# Patient Record
Sex: MALE | Race: WHITE | NOT HISPANIC OR LATINO | Employment: OTHER | URBAN - METROPOLITAN AREA
[De-identification: names, ages, dates, MRNs, and addresses within clinical notes are randomized per-mention and may not be internally consistent; named-entity substitution may affect disease eponyms.]

---

## 2017-05-24 ENCOUNTER — APPOINTMENT (EMERGENCY)
Dept: RADIOLOGY | Facility: HOSPITAL | Age: 47
End: 2017-05-24

## 2017-05-24 ENCOUNTER — HOSPITAL ENCOUNTER (EMERGENCY)
Facility: HOSPITAL | Age: 47
Discharge: HOME | End: 2017-05-24
Attending: EMERGENCY MEDICINE | Admitting: EMERGENCY MEDICINE

## 2017-05-24 VITALS
HEART RATE: 75 BPM | WEIGHT: 212 LBS | OXYGEN SATURATION: 95 % | SYSTOLIC BLOOD PRESSURE: 130 MMHG | DIASTOLIC BLOOD PRESSURE: 74 MMHG | TEMPERATURE: 98.1 F | RESPIRATION RATE: 18 BRPM

## 2017-05-24 DIAGNOSIS — S50.02XA CONTUSION OF LEFT ELBOW: Primary | ICD-10-CM

## 2017-05-24 PROCEDURE — 99283 EMERGENCY DEPT VISIT LOW MDM: CPT

## 2017-05-24 PROCEDURE — 73080 X-RAY EXAM OF ELBOW: CPT

## 2017-05-24 RX ORDER — IBUPROFEN 800 MG/1
800 TABLET ORAL EVERY 8 HOURS PRN
Qty: 21 TABLET | Refills: 0 | Status: SHIPPED | OUTPATIENT
Start: 2017-05-24 | End: 2021-01-21

## 2017-05-24 RX ORDER — IBUPROFEN 400 MG/1
800 TABLET ORAL ONCE
Status: COMPLETED | OUTPATIENT
Start: 2017-05-24 | End: 2017-05-24

## 2017-05-24 RX ADMIN — IBUPROFEN 800 MG: 400 TABLET ORAL at 07:54

## 2018-04-23 ENCOUNTER — APPOINTMENT (OUTPATIENT)
Dept: URGENT CARE | Age: 48
End: 2018-04-23
Payer: OTHER MISCELLANEOUS

## 2018-04-23 ENCOUNTER — TRANSCRIBE ORDERS (OUTPATIENT)
Dept: URGENT CARE | Age: 48
End: 2018-04-23

## 2018-04-23 ENCOUNTER — APPOINTMENT (OUTPATIENT)
Dept: RADIOLOGY | Age: 48
End: 2018-04-23
Payer: OTHER MISCELLANEOUS

## 2018-04-23 DIAGNOSIS — T14.90XA INJURY: Primary | ICD-10-CM

## 2018-04-23 DIAGNOSIS — T14.90XA INJURY: ICD-10-CM

## 2018-04-23 PROCEDURE — G0382 LEV 3 HOSP TYPE B ED VISIT: HCPCS | Performed by: PREVENTIVE MEDICINE

## 2018-04-23 PROCEDURE — 99283 EMERGENCY DEPT VISIT LOW MDM: CPT | Performed by: PREVENTIVE MEDICINE

## 2018-04-23 PROCEDURE — 73630 X-RAY EXAM OF FOOT: CPT

## 2018-04-23 PROCEDURE — 73610 X-RAY EXAM OF ANKLE: CPT

## 2018-04-26 ENCOUNTER — APPOINTMENT (OUTPATIENT)
Dept: URGENT CARE | Age: 48
End: 2018-04-26
Payer: OTHER MISCELLANEOUS

## 2018-04-26 PROCEDURE — 99213 OFFICE O/P EST LOW 20 MIN: CPT | Performed by: PREVENTIVE MEDICINE

## 2018-05-07 ENCOUNTER — APPOINTMENT (OUTPATIENT)
Dept: URGENT CARE | Age: 48
End: 2018-05-07
Payer: OTHER MISCELLANEOUS

## 2018-05-07 PROCEDURE — 99213 OFFICE O/P EST LOW 20 MIN: CPT | Performed by: PREVENTIVE MEDICINE

## 2021-01-11 ENCOUNTER — APPOINTMENT (EMERGENCY)
Dept: RADIOLOGY | Facility: HOSPITAL | Age: 51
End: 2021-01-11
Payer: MEDICARE

## 2021-01-11 ENCOUNTER — HOSPITAL ENCOUNTER (EMERGENCY)
Facility: HOSPITAL | Age: 51
Discharge: HOME | End: 2021-01-11
Attending: EMERGENCY MEDICINE
Payer: MEDICARE

## 2021-01-11 VITALS
DIASTOLIC BLOOD PRESSURE: 80 MMHG | WEIGHT: 227.96 LBS | BODY MASS INDEX: 32.71 KG/M2 | TEMPERATURE: 97.9 F | SYSTOLIC BLOOD PRESSURE: 139 MMHG | RESPIRATION RATE: 16 BRPM | HEART RATE: 60 BPM | OXYGEN SATURATION: 96 %

## 2021-01-11 DIAGNOSIS — R07.9 CHEST PAIN: Primary | ICD-10-CM

## 2021-01-11 LAB
ALBUMIN SERPL BCP-MCNC: 3.8 G/DL (ref 3.5–5)
ALP SERPL-CCNC: 116 U/L (ref 46–116)
ALT SERPL W P-5'-P-CCNC: 35 U/L (ref 12–78)
ANION GAP SERPL CALCULATED.3IONS-SCNC: 9 MMOL/L (ref 4–13)
APTT PPP: 28 SECONDS (ref 23–37)
AST SERPL W P-5'-P-CCNC: 15 U/L (ref 5–45)
ATRIAL RATE: 63 BPM
ATRIAL RATE: 71 BPM
BASOPHILS # BLD AUTO: 0.09 THOUSANDS/ΜL (ref 0–0.1)
BASOPHILS NFR BLD AUTO: 1 % (ref 0–1)
BILIRUB SERPL-MCNC: 0.35 MG/DL (ref 0.2–1)
BUN SERPL-MCNC: 13 MG/DL (ref 5–25)
CALCIUM SERPL-MCNC: 9 MG/DL (ref 8.3–10.1)
CHLORIDE SERPL-SCNC: 102 MMOL/L (ref 100–108)
CK SERPL-CCNC: 64 U/L (ref 39–308)
CO2 SERPL-SCNC: 27 MMOL/L (ref 21–32)
CREAT SERPL-MCNC: 1.03 MG/DL (ref 0.6–1.3)
EOSINOPHIL # BLD AUTO: 0.98 THOUSAND/ΜL (ref 0–0.61)
EOSINOPHIL NFR BLD AUTO: 10 % (ref 0–6)
ERYTHROCYTE [DISTWIDTH] IN BLOOD BY AUTOMATED COUNT: 11.5 % (ref 11.6–15.1)
GFR SERPL CREATININE-BSD FRML MDRD: 85 ML/MIN/1.73SQ M
GLUCOSE SERPL-MCNC: 120 MG/DL (ref 65–140)
HCT VFR BLD AUTO: 43.2 % (ref 36.5–49.3)
HGB BLD-MCNC: 14.4 G/DL (ref 12–17)
IMM GRANULOCYTES # BLD AUTO: 0.05 THOUSAND/UL (ref 0–0.2)
IMM GRANULOCYTES NFR BLD AUTO: 1 % (ref 0–2)
INR PPP: 0.89 (ref 0.84–1.19)
LIPASE SERPL-CCNC: 176 U/L (ref 73–393)
LYMPHOCYTES # BLD AUTO: 3.46 THOUSANDS/ΜL (ref 0.6–4.47)
LYMPHOCYTES NFR BLD AUTO: 36 % (ref 14–44)
MCH RBC QN AUTO: 32.1 PG (ref 26.8–34.3)
MCHC RBC AUTO-ENTMCNC: 33.3 G/DL (ref 31.4–37.4)
MCV RBC AUTO: 96 FL (ref 82–98)
MONOCYTES # BLD AUTO: 0.83 THOUSAND/ΜL (ref 0.17–1.22)
MONOCYTES NFR BLD AUTO: 9 % (ref 4–12)
NEUTROPHILS # BLD AUTO: 4.23 THOUSANDS/ΜL (ref 1.85–7.62)
NEUTS SEG NFR BLD AUTO: 43 % (ref 43–75)
NRBC BLD AUTO-RTO: 0 /100 WBCS
P AXIS: 49 DEGREES
P AXIS: 78 DEGREES
PLATELET # BLD AUTO: 234 THOUSANDS/UL (ref 149–390)
PMV BLD AUTO: 12 FL (ref 8.9–12.7)
POTASSIUM SERPL-SCNC: 3.6 MMOL/L (ref 3.5–5.3)
PR INTERVAL: 158 MS
PR INTERVAL: 164 MS
PROT SERPL-MCNC: 7.1 G/DL (ref 6.4–8.2)
PROTHROMBIN TIME: 12.1 SECONDS (ref 11.6–14.5)
QRS AXIS: -15 DEGREES
QRS AXIS: 0 DEGREES
QRSD INTERVAL: 102 MS
QRSD INTERVAL: 98 MS
QT INTERVAL: 384 MS
QT INTERVAL: 402 MS
QTC INTERVAL: 411 MS
QTC INTERVAL: 417 MS
RBC # BLD AUTO: 4.48 MILLION/UL (ref 3.88–5.62)
SODIUM SERPL-SCNC: 138 MMOL/L (ref 136–145)
T WAVE AXIS: 48 DEGREES
T WAVE AXIS: 54 DEGREES
TROPONIN I SERPL-MCNC: <0.02 NG/ML
TROPONIN I SERPL-MCNC: <0.02 NG/ML
VENTRICULAR RATE: 63 BPM
VENTRICULAR RATE: 71 BPM
WBC # BLD AUTO: 9.64 THOUSAND/UL (ref 4.31–10.16)

## 2021-01-11 PROCEDURE — 96360 HYDRATION IV INFUSION INIT: CPT

## 2021-01-11 PROCEDURE — 80053 COMPREHEN METABOLIC PANEL: CPT | Performed by: PHYSICIAN ASSISTANT

## 2021-01-11 PROCEDURE — 83690 ASSAY OF LIPASE: CPT | Performed by: PHYSICIAN ASSISTANT

## 2021-01-11 PROCEDURE — 85730 THROMBOPLASTIN TIME PARTIAL: CPT | Performed by: PHYSICIAN ASSISTANT

## 2021-01-11 PROCEDURE — 85025 COMPLETE CBC W/AUTO DIFF WBC: CPT | Performed by: PHYSICIAN ASSISTANT

## 2021-01-11 PROCEDURE — 85610 PROTHROMBIN TIME: CPT | Performed by: PHYSICIAN ASSISTANT

## 2021-01-11 PROCEDURE — 96361 HYDRATE IV INFUSION ADD-ON: CPT

## 2021-01-11 PROCEDURE — 93010 ELECTROCARDIOGRAM REPORT: CPT | Performed by: INTERNAL MEDICINE

## 2021-01-11 PROCEDURE — 99285 EMERGENCY DEPT VISIT HI MDM: CPT | Performed by: PHYSICIAN ASSISTANT

## 2021-01-11 PROCEDURE — 99285 EMERGENCY DEPT VISIT HI MDM: CPT

## 2021-01-11 PROCEDURE — 71045 X-RAY EXAM CHEST 1 VIEW: CPT

## 2021-01-11 PROCEDURE — 36415 COLL VENOUS BLD VENIPUNCTURE: CPT | Performed by: PHYSICIAN ASSISTANT

## 2021-01-11 PROCEDURE — 82550 ASSAY OF CK (CPK): CPT | Performed by: PHYSICIAN ASSISTANT

## 2021-01-11 PROCEDURE — 84484 ASSAY OF TROPONIN QUANT: CPT | Performed by: PHYSICIAN ASSISTANT

## 2021-01-11 PROCEDURE — 93005 ELECTROCARDIOGRAM TRACING: CPT

## 2021-01-11 RX ADMIN — SODIUM CHLORIDE 1000 ML: 0.9 INJECTION, SOLUTION INTRAVENOUS at 02:26

## 2021-01-11 NOTE — Clinical Note
Antonia Cowan was seen and treated in our emergency department on 1/11/2021  Diagnosis:     Cuate Padilla    He may return on this date: 01/12/2021         If you have any questions or concerns, please don't hesitate to call        Bonnell Libman, PA-C    ______________________________           _______________          _______________  Hospital Representative                              Date                                Time

## 2021-01-11 NOTE — ED PROVIDER NOTES
History  Chief Complaint   Patient presents with    Chest Pain     Pt presents to the ED with c/o chest pain that woke him up  No chest pain currently, also c/o sore throat  Patient is a 14-year-old male with no significant past medical surgical history that presents emergency department with abrupt onset sharp nonradiating now abated left-sided chest pain for 1 hour  Patient denies associated symptomatology  Patient states that he was sleeping on his right side when he felt abrupt onset chest pain on the left-hand side of his chest that quickly abated spontaneously  Patient denies history of chest pain symptoms to present  Patient denies recent long train rides, plane rides, or other period of inactivity   Patient denies history of DVT, PE, and thrombophlebitis  Patient denies palliative and provocative factors  Patient denies not effective treatment  Patient denies fevers, chills, nausea, vomiting, diarrhea, constipation urinary symptoms  Patient's recent fall or recent trauma  Patient denies sick contacts or recent travel  Patient denies recent cough or URI symptoms  Patient states that he is a current half a pack a day cigarette smoker  Patient denies shortness of breath, and abdominal pain        History provided by:  Patient   used: No    Chest Pain  Pain location:  L chest  Pain quality: sharp    Pain radiates to:  Does not radiate  Pain radiates to the back: no    Pain severity:  Mild  Onset quality:  Sudden  Duration:  1 hour  Timing:  Sporadic  Progression:  Resolved  Chronicity:  New  Relieved by:  Nothing  Worsened by:  Nothing tried  Ineffective treatments:  None tried  Associated symptoms: no abdominal pain, no anxiety, no back pain, no cough, no dizziness, no fever, no headache, no nausea, no numbness, no palpitations, no shortness of breath, not vomiting and no weakness    Risk factors: male sex and smoking    Risk factors: no prior DVT/PE        Prior to Admission Medications   Prescriptions Last Dose Informant Patient Reported? Taking?   ibuprofen (MOTRIN) 800 mg tablet Not Taking at Unknown time  No No   Sig: Take 1 tablet by mouth every 8 (eight) hours as needed for mild pain (take with food)   Patient not taking: Reported on 1/11/2021   oxyCODONE-acetaminophen (PERCOCET) 5-325 mg per tablet Not Taking at Unknown time  No No   Sig: Take 1 tablet by mouth every 6 (six) hours as needed for moderate pain for up to 10 doses Max Daily Amount: 4 tablets   Patient not taking: Reported on 1/11/2021   oxyCODONE-acetaminophen (PERCOCET) 5-325 mg per tablet Not Taking at Unknown time  No No   Sig: Take 1 tablet by mouth every 6 (six) hours as needed for moderate pain for up to 10 doses Max Daily Amount: 4 tablets   Patient not taking: Reported on 1/11/2021      Facility-Administered Medications: None       No past medical history on file  No past surgical history on file  No family history on file  I have reviewed and agree with the history as documented  E-Cigarette/Vaping     E-Cigarette/Vaping Substances     Social History     Tobacco Use    Smoking status: Current Every Day Smoker   Substance Use Topics    Alcohol use: Not on file    Drug use: Not on file       Review of Systems   Constitutional: Negative for activity change, appetite change, chills and fever  HENT: Negative for congestion, postnasal drip, rhinorrhea, sinus pressure, sinus pain, sore throat and tinnitus  Eyes: Negative for photophobia and visual disturbance  Respiratory: Negative for cough, chest tightness and shortness of breath  Cardiovascular: Positive for chest pain  Negative for palpitations  Gastrointestinal: Negative for abdominal pain, constipation, diarrhea, nausea and vomiting  Genitourinary: Negative for difficulty urinating, dysuria, flank pain, frequency and urgency  Musculoskeletal: Negative for back pain, gait problem, neck pain and neck stiffness     Skin: Negative for pallor and rash  Allergic/Immunologic: Negative for environmental allergies and food allergies  Neurological: Negative for dizziness, weakness, numbness and headaches  Psychiatric/Behavioral: Negative for confusion  All other systems reviewed and are negative  Physical Exam  Physical Exam  Vitals signs and nursing note reviewed  Constitutional:       General: He is awake  Appearance: Normal appearance  He is well-developed  He is not ill-appearing, toxic-appearing or diaphoretic  HENT:      Head: Normocephalic and atraumatic  Right Ear: Hearing and external ear normal  No decreased hearing noted  No drainage, swelling or tenderness  No mastoid tenderness  Left Ear: Hearing and external ear normal  No decreased hearing noted  No drainage, swelling or tenderness  No mastoid tenderness  Nose: Nose normal       Mouth/Throat:      Lips: Pink  Mouth: Mucous membranes are moist       Pharynx: Oropharynx is clear  Uvula midline  Eyes:      General: Lids are normal  Vision grossly intact  Right eye: No discharge  Left eye: No discharge  Extraocular Movements: Extraocular movements intact  Conjunctiva/sclera: Conjunctivae normal       Pupils: Pupils are equal, round, and reactive to light  Neck:      Musculoskeletal: Full passive range of motion without pain, normal range of motion and neck supple  Normal range of motion  No neck rigidity, spinous process tenderness or muscular tenderness  Vascular: No JVD  Trachea: Trachea and phonation normal  No tracheal tenderness or tracheal deviation  Cardiovascular:      Rate and Rhythm: Normal rate and regular rhythm  Pulses: Normal pulses  Radial pulses are 2+ on the right side and 2+ on the left side  Posterior tibial pulses are 2+ on the right side and 2+ on the left side  Heart sounds: Normal heart sounds     Pulmonary:      Effort: Pulmonary effort is normal  Breath sounds: Normal breath sounds  No stridor  No decreased breath sounds, wheezing, rhonchi or rales  Chest:      Chest wall: No tenderness  Comments: Patient has no overlying epidermal rashes, lesions, erythema, ecchymosis, or abrasion anterior and posterior thoracic region  Skin intact  Abdominal:      General: Abdomen is flat  Bowel sounds are normal  There is no distension  Palpations: Abdomen is soft  Abdomen is not rigid  Tenderness: There is no abdominal tenderness  There is no guarding or rebound  Musculoskeletal: Normal range of motion  Right lower leg: Normal       Left lower leg: Normal    Lymphadenopathy:      Head:      Right side of head: No submental, submandibular, tonsillar, preauricular, posterior auricular or occipital adenopathy  Left side of head: No submental, submandibular, tonsillar, preauricular, posterior auricular or occipital adenopathy  Cervical: No cervical adenopathy  Right cervical: No superficial, deep or posterior cervical adenopathy  Left cervical: No superficial, deep or posterior cervical adenopathy  Skin:     General: Skin is warm  Capillary Refill: Capillary refill takes less than 2 seconds  Neurological:      General: No focal deficit present  Mental Status: He is alert and oriented to person, place, and time  GCS: GCS eye subscore is 4  GCS verbal subscore is 5  GCS motor subscore is 6  Sensory: No sensory deficit  Deep Tendon Reflexes: Reflexes are normal and symmetric  Reflex Scores:       Patellar reflexes are 2+ on the right side and 2+ on the left side  Psychiatric:         Mood and Affect: Mood normal          Speech: Speech normal          Behavior: Behavior normal  Behavior is cooperative  Thought Content:  Thought content normal          Judgment: Judgment normal          Vital Signs  ED Triage Vitals   Temperature Pulse Respirations Blood Pressure SpO2   01/11/21 0156 01/11/21 0156 01/11/21 0156 01/11/21 0156 01/11/21 0156   97 9 °F (36 6 °C) 85 16 (!) 174/96 98 %      Temp Source Heart Rate Source Patient Position - Orthostatic VS BP Location FiO2 (%)   01/11/21 0156 01/11/21 0156 01/11/21 0230 01/11/21 0156 --   Oral Monitor Lying Right arm       Pain Score       01/11/21 0156       3           Vitals:    01/11/21 0430 01/11/21 0445 01/11/21 0500 01/11/21 0515   BP: 158/92  139/80    Pulse: 62 58 60 60   Patient Position - Orthostatic VS:             Visual Acuity      ED Medications  Medications   sodium chloride 0 9 % bolus 1,000 mL (0 mL Intravenous Stopped 1/11/21 0625)       Diagnostic Studies  Results Reviewed     Procedure Component Value Units Date/Time    Troponin I repeat in 3hrs [[id number]]  (Normal) Collected: 01/11/21 0523    Lab Status: Final result Specimen: Blood from Arm, Left Updated: 01/11/21 0624     Troponin I <0 02 ng/mL     Troponin I [[id number]]  (Normal) Collected: 01/11/21 0229    Lab Status: Final result Specimen: Blood from Arm, Left Updated: 01/11/21 0315     Troponin I <0 02 ng/mL     Comprehensive metabolic panel [[id number]] Collected: 01/11/21 0227    Lab Status: Final result Specimen: Blood from Arm, Left Updated: 01/11/21 9571     Sodium 138 mmol/L      Potassium 3 6 mmol/L      Chloride 102 mmol/L      CO2 27 mmol/L      ANION GAP 9 mmol/L      BUN 13 mg/dL      Creatinine 1 03 mg/dL      Glucose 120 mg/dL      Calcium 9 0 mg/dL      AST 15 U/L      ALT 35 U/L      Alkaline Phosphatase 116 U/L      Total Protein 7 1 g/dL      Albumin 3 8 g/dL      Total Bilirubin 0 35 mg/dL      eGFR 85 ml/min/1 73sq m     Narrative:      Cory guidelines for Chronic Kidney Disease (CKD):     Stage 1 with normal or high GFR (GFR > 90 mL/min/1 73 square meters)    Stage 2 Mild CKD (GFR = 60-89 mL/min/1 73 square meters)    Stage 3A Moderate CKD (GFR = 45-59 mL/min/1 73 square meters)    Stage 3B Moderate CKD (GFR = 30-44 mL/min/1 73 square meters)    Stage 4 Severe CKD (GFR = 15-29 mL/min/1 73 square meters)    Stage 5 End Stage CKD (GFR <15 mL/min/1 73 square meters)  Note: GFR calculation is accurate only with a steady state creatinine    Lipase [[id number]]  (Normal) Collected: 01/11/21 0227    Lab Status: Final result Specimen: Blood from Arm, Left Updated: 01/11/21 0312     Lipase 176 u/L     CK Total with Reflex CKMB [[id number]]  (Normal) Collected: 01/11/21 0227    Lab Status: Final result Specimen: Blood from Arm, Left Updated: 01/11/21 0312     Total CK 64 U/L     Protime-INR [[id number]]  (Normal) Collected: 01/11/21 0227    Lab Status: Final result Specimen: Blood from Arm, Left Updated: 01/11/21 0249     Protime 12 1 seconds      INR 0 89    APTT [[id number]]  (Normal) Collected: 01/11/21 0227    Lab Status: Final result Specimen: Blood from Arm, Left Updated: 01/11/21 0249     PTT 28 seconds     CBC and differential [[id number]]  (Abnormal) Collected: 01/11/21 0227    Lab Status: Final result Specimen: Blood from Arm, Left Updated: 01/11/21 0237     WBC 9 64 Thousand/uL      RBC 4 48 Million/uL      Hemoglobin 14 4 g/dL      Hematocrit 43 2 %      MCV 96 fL      MCH 32 1 pg      MCHC 33 3 g/dL      RDW 11 5 %      MPV 12 0 fL      Platelets 047 Thousands/uL      nRBC 0 /100 WBCs      Neutrophils Relative 43 %      Immat GRANS % 1 %      Lymphocytes Relative 36 %      Monocytes Relative 9 %      Eosinophils Relative 10 %      Basophils Relative 1 %      Neutrophils Absolute 4 23 Thousands/µL      Immature Grans Absolute 0 05 Thousand/uL      Lymphocytes Absolute 3 46 Thousands/µL      Monocytes Absolute 0 83 Thousand/µL      Eosinophils Absolute 0 98 Thousand/µL      Basophils Absolute 0 09 Thousands/µL                  XR chest 1 view portable   ED Interpretation by Tang Taylor PA-C (01/11 0194)   Chest x-ray no acute cardiopulmonary disease on initial read                 Procedures  ECG 12 Lead Documentation Only    Date/Time: 1/11/2021 2:01 AM  Performed by: Shailesh Owusu PA-C  Authorized by: Shailesh Owusu PA-C     Indications / Diagnosis:  Chest pain  ECG reviewed by me, the ED Provider: yes    Patient location:  ED  Previous ECG:     Previous ECG:  Compared to current    Similarity:  No change    Comparison to cardiac monitor: Yes    Interpretation:     Interpretation: normal    Rate:     ECG rate:  71    ECG rate assessment: normal    Rhythm:     Rhythm: sinus rhythm    Ectopy:     Ectopy: none    QRS:     QRS axis:  Normal    QRS intervals:  Normal  Conduction:     Conduction: normal    ST segments:     ST segments:  Normal  T waves:     T waves: normal      ECG 12 Lead Documentation Only    Date/Time: 1/11/2021 5:20 AM  Performed by: Shailesh Owusu PA-C  Authorized by: Shailesh Owusu PA-C     Indications / Diagnosis:  Chest pain; delta  ECG reviewed by me, the ED Provider: yes    Patient location:  ED  Previous ECG:     Previous ECG:  Compared to current    Similarity:  No change    Comparison to cardiac monitor: Yes    Interpretation:     Interpretation: normal    Rate:     ECG rate:  63    ECG rate assessment: normal    Rhythm:     Rhythm: sinus rhythm    Ectopy:     Ectopy: none    QRS:     QRS axis:  Normal    QRS intervals:  Normal  Conduction:     Conduction: normal    ST segments:     ST segments:  Normal  T waves:     T waves: normal               ED Course  ED Course as of Jan 11 0700   Mon Jan 11, 2021   0211 Patient verbalizes left-sided chest pain symptoms sharp and shooting abrupt onset nonradiating  Patient denies pain at this time  Patient has history of DVT, PE, thrombophlebitis  0964 Patient denies pain at this time        0345 Patient denies pain      0451 Patient denies throat pain       0626 Troponin I: <0 02             HEART Risk Score      Most Recent Value   Heart Score Risk Calculator   History  0 Filed at: 01/11/2021 0317   ECG  0 Filed at: 01/11/2021 4948   Age  0 Filed at: 01/11/2021 0317   Risk Factors  1 Filed at: 01/11/2021 0317   Troponin  0 Filed at: 01/11/2021 0317   HEART Score  1 Filed at: 01/11/2021 0317              PERC Rule for PE      Most Recent Value   PERC Rule for PE   Age >=50  0 Filed at: 01/11/2021 0218   HR >=100  0 Filed at: 01/11/2021 0218   O2 Sat on room air < 95%  0 Filed at: 01/11/2021 9666   History of PE or DVT  0 Filed at: 01/11/2021 7769   Recent trauma or surgery  0 Filed at: 01/11/2021 0218   Hemoptysis  0 Filed at: 01/11/2021 1789   Exogenous estrogen  0 Filed at: 01/11/2021 0077   Unilateral leg swelling  0 Filed at: 01/11/2021 5842   PERC Rule for PE Results  0 Filed at: 01/11/2021 6379                    Wells' Criteria for DVT      Most Recent Value   Wells' Criteria for DVT   Active cancer Treatment or palliation within 6 months  0 Filed at: 01/11/2021 9309   Bedridden recently >3 days or major surgery within 12 weeks  0 Filed at: 01/11/2021 4168   Calf swelling >3 cm compared to the other leg  0 Filed at: 01/11/2021 0725   Entire leg swollen  0 Filed at: 01/11/2021 3595   Collateral (nonvaricose) superficial veins present  0 Filed at: 01/11/2021 2823   Localized tenderness along the deep venous system  0 Filed at: 01/11/2021 9997   Pitting edema, confined to symptomatic leg  0 Filed at: 01/11/2021 0545   Paralysis, paresis, or recent plaster immobilization of the lower extremity  0 Filed at: 01/11/2021 3445   Previously documented DVT  0 Filed at: 01/11/2021 0472   Alternative diagnosis to DVT as likely or more likely  0 Filed at: 01/11/2021 3313   Wells DVT Critera Score  0 Filed at: 01/11/2021 0218              MDM  Number of Diagnoses or Management Options  Chest pain: new and does not require workup     Amount and/or Complexity of Data Reviewed  Clinical lab tests: ordered and reviewed  Tests in the radiology section of CPT®: ordered and reviewed  Review and summarize past medical records: yes  Independent visualization of images, tracings, or specimens: yes    Risk of Complications, Morbidity, and/or Mortality  Presenting problems: moderate  Diagnostic procedures: moderate  Management options: low    Patient Progress  Patient progress: stable     Patient is a 57-year-old male with no significant past medical surgical history that presents emergency department with abrupt onset sharp nonradiating now abated left-sided chest pain for 1 hour  Patient denies associated symptomatology  No leukocytosis with negative lipase with acute pancreatitis unlikely  Normal liver enzymes, normal total bilirubin, and normal alk-phos with hepatobiliary pathology not likely  Normal kidney function; normal electrolytes  Initial read on chest x-ray no acute cardiopulmonary disease   Initial troponin negative, delta troponin negative  Initial ECG normal sinus rhythm, delta ECG normal sinus rhythm   Negative CK  Patient denied pain throughout the entire ED visit  Wells 0, Perc 0, Heart 0  Follow-up with cardiology for ordered exercise stress test  Follow-up with PCP  Follow up with emergency department symptoms persist or exacerbate  Patient demonstrates verbal understanding of all clinical laboratory and imaging findings, discharge instructions, follow-up, and treatment plan    Disposition  Final diagnoses:   Chest pain     Time reflects when diagnosis was documented in both MDM as applicable and the Disposition within this note     Time User Action Codes Description Comment    1/11/2021  6:27 AM Alfonso Boothe Add [R07 9] Chest pain       ED Disposition     ED Disposition Condition Date/Time Comment    Discharge Stable Mon Jan 11, 2021  6:27 AM Noemy Lindsay  discharge to home/self care              Follow-up Information     Follow up With Specialties Details Why Contact Info Additional Information    [facility] [place] [facility] Call in 1 week for further evaluation of symptoms;stress test [phone]  [address] [facility] [address] [facility], [place], [place], [address] [facility] Call in 1 week for further evaluation of symptoms [address] [phone]  [address]  , [place], [place], [address]    [facility] Emergency Medicine Go to  As needed [address] [facility] [address], [address], [place], [place], [address]          Discharge Medication List as of 1/11/2021  6:27 AM      CONTINUE these medications which have NOT CHANGED    Details   ibuprofen (MOTRIN) 800 mg tablet Take 1 tablet by mouth every 8 (eight) hours as needed for mild pain (take with food), Starting 5/24/2017, Until Discontinued, Print      !! oxyCODONE-acetaminophen (PERCOCET) 5-325 mg per tablet Take 1 tablet by mouth every 6 (six) hours as needed for moderate pain for up to 10 doses Max Daily Amount: 4 tablets, Starting 10/13/2016, Until Discontinued, Print      !! oxyCODONE-acetaminophen (PERCOCET) 5-325 mg per tablet Take 1 tablet by mouth every 6 (six) hours as needed for moderate pain for up to 10 doses Max Daily Amount: 4 tablets, Starting 10/13/2016, Until Discontinued, Print       !! - Potential duplicate medications found  Please discuss with provider  No discharge procedures on file      PDMP Review     None          ED Provider  Electronically Signed by           Arabella Landry PA-C  01/11/21 0700       Arabella Landry PA-C  01/11/21 0700

## 2021-01-11 NOTE — DISCHARGE INSTRUCTIONS
Follow-up with cardiology for stress test  Follow-up with PCP  Follow up emergency department symptoms persist or exacerbate

## 2021-01-15 ENCOUNTER — OFFICE VISIT (OUTPATIENT)
Dept: FAMILY MEDICINE CLINIC | Facility: OTHER | Age: 51
End: 2021-01-15
Payer: MEDICARE

## 2021-01-15 VITALS
RESPIRATION RATE: 18 BRPM | WEIGHT: 218 LBS | HEIGHT: 71 IN | TEMPERATURE: 97.8 F | BODY MASS INDEX: 30.52 KG/M2 | SYSTOLIC BLOOD PRESSURE: 150 MMHG | DIASTOLIC BLOOD PRESSURE: 80 MMHG | OXYGEN SATURATION: 98 % | HEART RATE: 100 BPM

## 2021-01-15 DIAGNOSIS — F17.218 CIGARETTE NICOTINE DEPENDENCE WITH OTHER NICOTINE-INDUCED DISORDER: ICD-10-CM

## 2021-01-15 DIAGNOSIS — I10 ESSENTIAL HYPERTENSION: ICD-10-CM

## 2021-01-15 DIAGNOSIS — R07.9 CHEST PAIN, UNSPECIFIED TYPE: Primary | ICD-10-CM

## 2021-01-15 DIAGNOSIS — Z23 ENCOUNTER FOR IMMUNIZATION: ICD-10-CM

## 2021-01-15 PROCEDURE — 99203 OFFICE O/P NEW LOW 30 MIN: CPT | Performed by: FAMILY MEDICINE

## 2021-01-15 PROCEDURE — 90471 IMMUNIZATION ADMIN: CPT

## 2021-01-15 PROCEDURE — 90686 IIV4 VACC NO PRSV 0.5 ML IM: CPT

## 2021-01-15 RX ORDER — LISINOPRIL 10 MG/1
10 TABLET ORAL DAILY
Qty: 30 TABLET | Refills: 2 | Status: SHIPPED | OUTPATIENT
Start: 2021-01-15

## 2021-01-15 NOTE — PROGRESS NOTES
Assessment/Plan:    No problem-specific Assessment & Plan notes found for this encounter  Problem List Items Addressed This Visit     None      Visit Diagnoses     Chest pain, unspecified type    -  Primary    Relevant Orders  No pain currently and since discharge from ER  ER work up negative to date  Will check labs and refer to cards as he is recommended to follow up in 1-2 weeks for possible stress test and other eval per cards  Ambulatory referral to Cardiology    Encounter for immunization        Relevant Orders    influenza vaccine, quadrivalent, 0 5 mL, preservative-free, for adult and pediatric patients 6 mos+ (AFLURIA, FLUARIX, FLULAVAL, FLUZONE) (Completed)    Essential hypertension        Relevant Medications  Will start on the following and recommended to start at 5 mg daily and increase if BP is still high  Low salt diet and dash diet hand out provided  FU in 2-4 weeks  lisinopril (ZESTRIL) 10 mg tablet    Other Relevant Orders    TSH, 3rd generation with Free T4 reflex    Lipid panel    HEMOGLOBIN A1C W/ EAG ESTIMATION    UA w Reflex to Microscopic w Reflex to Culture -Lab Collect    Ambulatory referral to Cardiology    Cigarette nicotine dependence with other nicotine-induced disorder        Relevant Orders  Discussed smoking cessation as per shahida    Ambulatory referral to Cardiology        BMI Counseling: Body mass index is 30 4 kg/m²  The BMI is above normal  Nutrition recommendations include limiting drinks that contain sugar and reducing intake of cholesterol  Exercise recommendations include moderate physical activity 150 minutes/week  Tobacco Cessation Counseling: Tobacco cessation counseling was provided  The patient is sincerely urged to quit consumption of tobacco  He is not ready to quit tobacco  Tobacco Cessation Counseling: Tobacco cessation counseling and education was provided   The patient is sincerely urged to quit consumption of tobacco  He is not ready to quit tobacco  The numerous health risks of tobacco consumption were discussed  If he decides to quit, there are a number of helpful adjunctive aids, and he can see me to discuss nicotine replacement therapy, chantix, or bupropion anytime in the future  Will be cutting down for sure  Time spent 3-5 minutes  Subjective:      Patient ID: Enolia Litten  is a 52 y o  male  Patient is here to establish care today  Was seen at the ER on 1/11/21 due to sharp chest pain  ER course:  Patient is a 57-year-old male with no significant past medical surgical history that presents emergency department with abrupt onset sharp nonradiating now abated left-sided chest pain for 1 hour  Patient denies associated symptomatology  No leukocytosis with negative lipase with acute pancreatitis unlikely  Normal liver enzymes, normal total bilirubin, and normal alk-phos with hepatobiliary pathology not likely  Normal kidney function; normal electrolytes  Initial read on chest x-ray no acute cardiopulmonary disease   Initial troponin negative, delta troponin negative  Initial ECG normal sinus rhythm, delta ECG normal sinus rhythm   Negative CK  Patient denied pain throughout the entire ED visit  Wells 0, Perc 0, Heart 0  Follow-up with cardiology for ordered exercise stress test  Follow-up with PCP  Follow up with emergency department symptoms persist or exacerbate  Patient demonstrates verbal understanding of all clinical laboratory and imaging findings, discharge instructions, follow-up, and treatment plan     Today:  Patient is doing well and back to his baseline  No chest pain noted since discharge from the ER on 1/11/21  He has no SOB or Cough or congestion  Denies any HARRISON  Denies neurologic deficits either  Due to increased BP twice already will start on lisinopril daily to control  Most like has HTN  Patient is long time smoker since age 27 1/2 PPD smoking up to age 52    Strongly advised against smoking but he is not ready and would like to start by reducing and cutting down first which is reasonable  The following portions of the patient's history were reviewed and updated as appropriate:   He  has no past medical history on file  He There are no active problems to display for this patient  He  has a past surgical history that includes Appendectomy; Tonsillectomy; and Knee surgery  His family history is not on file  He  reports that he has been smoking cigarettes  He has been smoking about 0 50 packs per day  He has never used smokeless tobacco  He reports previous alcohol use  He reports that he does not use drugs  Current Outpatient Medications   Medication Sig Dispense Refill    ibuprofen (MOTRIN) 800 mg tablet Take 1 tablet by mouth every 8 (eight) hours as needed for mild pain (take with food) (Patient not taking: Reported on 1/11/2021) 21 tablet 0    lisinopril (ZESTRIL) 10 mg tablet Take 1 tablet (10 mg total) by mouth daily 30 tablet 2    oxyCODONE-acetaminophen (PERCOCET) 5-325 mg per tablet Take 1 tablet by mouth every 6 (six) hours as needed for moderate pain for up to 10 doses Max Daily Amount: 4 tablets (Patient not taking: Reported on 1/11/2021) 10 tablet 0    oxyCODONE-acetaminophen (PERCOCET) 5-325 mg per tablet Take 1 tablet by mouth every 6 (six) hours as needed for moderate pain for up to 10 doses Max Daily Amount: 4 tablets (Patient not taking: Reported on 1/11/2021) 10 tablet 0     No current facility-administered medications for this visit        Current Outpatient Medications on File Prior to Visit   Medication Sig    ibuprofen (MOTRIN) 800 mg tablet Take 1 tablet by mouth every 8 (eight) hours as needed for mild pain (take with food) (Patient not taking: Reported on 1/11/2021)    oxyCODONE-acetaminophen (PERCOCET) 5-325 mg per tablet Take 1 tablet by mouth every 6 (six) hours as needed for moderate pain for up to 10 doses Max Daily Amount: 4 tablets (Patient not taking: Reported on 1/11/2021)    oxyCODONE-acetaminophen (PERCOCET) 5-325 mg per tablet Take 1 tablet by mouth every 6 (six) hours as needed for moderate pain for up to 10 doses Max Daily Amount: 4 tablets (Patient not taking: Reported on 1/11/2021)     No current facility-administered medications on file prior to visit  He is allergic to penicillins       Review of Systems   Constitutional: Negative for fatigue, fever and unexpected weight change  HENT: Negative for congestion  Eyes: Negative for visual disturbance  Respiratory: Negative for cough and shortness of breath  Cardiovascular: Negative for chest pain and palpitations  Gastrointestinal: Negative for abdominal pain  Musculoskeletal: Negative for arthralgias and myalgias  Neurological: Negative for weakness, light-headedness, numbness and headaches  Objective:      /80   Pulse 100   Temp 97 8 °F (36 6 °C)   Resp 18   Ht 5' 11" (1 803 m)   Wt 98 9 kg (218 lb)   SpO2 98%   BMI 30 40 kg/m²          Physical Exam  Vitals signs and nursing note reviewed  Constitutional:       General: He is not in acute distress  Appearance: He is well-developed  He is not diaphoretic  HENT:      Head: Normocephalic and atraumatic  Eyes:      General: No scleral icterus  Right eye: No discharge  Left eye: No discharge  Neck:      Musculoskeletal: Normal range of motion and neck supple  Cardiovascular:      Rate and Rhythm: Normal rate and regular rhythm  Heart sounds: Normal heart sounds  No murmur  Pulmonary:      Effort: Pulmonary effort is normal  No respiratory distress  Breath sounds: Normal breath sounds  No wheezing  Musculoskeletal:      Right lower leg: No edema  Left lower leg: No edema  Lymphadenopathy:      Cervical: No cervical adenopathy  Skin:     General: Skin is warm and dry  Coloration: Skin is not pale  Findings: No erythema or rash     Neurological:      General: No focal deficit present  Mental Status: He is oriented to person, place, and time     Psychiatric:         Mood and Affect: Mood normal          Behavior: Behavior normal          Lab Results   Component Value Date    WBC 9 64 01/11/2021    HGB 14 4 01/11/2021    HCT 43 2 01/11/2021     01/11/2021    ALT 35 01/11/2021    AST 15 01/11/2021     02/26/2015    K 3 6 01/11/2021     01/11/2021    CREATININE 1 03 01/11/2021    BUN 13 01/11/2021    CO2 27 01/11/2021    INR 0 89 01/11/2021     No results found for: [id number], TSH

## 2021-01-15 NOTE — PATIENT INSTRUCTIONS
Cigarette Smoking and Your Health   AMBULATORY CARE:   Risks to your health if you smoke:  Nicotine and other chemicals found in tobacco and e-cigarettes can damage every cell in your body  Even if you are a light smoker, you have an increased risk for cancer, heart disease, and lung disease  If you are pregnant or have diabetes, smoking increases your risk for complications  Nicotine can affect an adolescent's developing brain  This can lead to trouble thinking, learning, or paying attention  Benefits to your health if you stop smoking:   · You decrease respiratory symptoms such as coughing, wheezing, and shortness of breath  · You reduce your risk for cancers of the lung, mouth, throat, kidney, bladder, pancreas, stomach, and cervix  If you already have cancer, you increase the benefits of chemotherapy  You also reduce your risk for cancer returning or a second cancer from developing  · You reduce your risk for heart disease, blood clots, heart attack, and stroke  · You reduce your risk for lung infections, and diseases such as pneumonia, asthma, chronic bronchitis, and emphysema  · Your circulation improves  More oxygen can be delivered to your body  If you have diabetes, you lower your risk for complications, such as kidney, artery, and eye diseases  You also lower your risk for nerve damage  Nerve damage can lead to amputations, poor vision, and blindness  · You improve your body's ability to heal and to fight infections  · An adolescent can help his or her brain and body develop in a healthy way  Talk to your adolescent about all the health risks of nicotine  If you can, start talking about nicotine when your child is younger than 12 years  This may make it easier for him or her not to start using nicotine as a teenager or adult  Explain to him or her that it is best never to start  It can be hard to try to quit later      Benefits to the health of others if you stop smoking:  Tobacco is harmful to nonsmokers who breathe in your secondhand smoke  The following are ways the health of others around you may improve when you stop smoking:  · You lower the risks for lung cancer and heart disease in nonsmoking adults  · If you are pregnant, you lower the risk for miscarriage, early delivery, low birth weight, and stillbirth  You also lower your baby's risk for SIDS, obesity, developmental delay, and neurobehavioral problems, such as ADHD  · If you have children, you lower their risk for ear infections, colds, pneumonia, bronchitis, and asthma  Follow up with your doctor as directed:  Write down your questions so you remember to ask them during your visits  For more information and support to stop smoking:   · [employer]  gov  Phone: 6- 747 - 381-4612  Web Address: [employer]  [employer] [address] Information is for End User's use only and may not be sold, redistributed or otherwise used for commercial purposes  All illustrations and images included in CareNotes® are the copyrighted property of [employer]  or [address] [place] [address]  The above information is an  only  It is not intended as medical advice for individual conditions or treatments  Talk to your doctor, nurse or pharmacist before following any medical regimen to see if it is safe and effective for you  DASH Eating Plan   WHAT YOU NEED TO KNOW:   The DASH (Dietary Approaches to Stop Hypertension) Eating Plan is designed to help prevent or lower high blood pressure  It can also help to lower LDL (bad) cholesterol and decrease your risk of heart disease  The plan is low in sodium, sugar, unhealthy fats, and total fat  It is high in potassium, calcium, magnesium, and fiber  These nutrients are added when you eat more fruits, vegetables, and whole grains  DISCHARGE INSTRUCTIONS:   Your sodium limit each day: Your dietitian will tell you how much sodium is safe for you to have each day   People with high blood pressure should have no more than 1,500 to 2,300 mg of sodium in a day  A teaspoon (tsp) of salt has 2,300 mg of sodium  This may seem like a difficult goal, but small changes to the foods you eat can make a big difference  Your healthcare provider or dietitian can help you create a meal plan that follows your sodium limit  How to limit sodium:   · Read food labels  Food labels can help you choose foods that are low in sodium  The amount of sodium is listed in milligrams (mg)  The % Daily Value (DV) column tells you how much of your daily needs are met by 1 serving of the food for each nutrient listed  Choose foods that have less than 5% of the DV of sodium  These foods are considered low in sodium  Foods that have 20% or more of the DV of sodium are considered high in sodium  Avoid foods that have more than 300 mg of sodium in each serving  Choose foods that say low-sodium, reduced-sodium, or no salt added on the food label  · Avoid salt  Do not salt food at the table, and add very little salt to foods during cooking  Use herbs and spices, such as onions, garlic, and salt-free seasonings to add flavor to foods  Try lemon or lime juice or vinegar to give foods a tart flavor  Use hot peppers or a small amount of hot pepper sauce to add a spicy flavor to foods  · Ask about salt substitutes  Ask your healthcare provider if you may use salt substitutes  Some salt substitutes have ingredients that can be harmful if you have certain health conditions  · Choose foods carefully at restaurants  Meals from restaurants, especially fast food restaurants, are often high in sodium  Some restaurants have nutrition information that tells you the amount of sodium in their foods  Ask to have your food prepared with less, or no salt  What you need to know about fats:   · Include healthy fats  Examples are unsaturated fats and omega-3 fatty acids   Unsaturated fats are found in soybean, canola, olive, or sunflower oil, and liquid and soft tub margarines  Omega-3 fatty acids are found in fatty fish, such as salmon, tuna, mackerel, and sardines  It is also found in flaxseed oil and ground flaxseed  · Avoid unhealthy fats  Do not eat unhealthy fats, such as saturated fats and trans fats  Saturated fats are found in foods that contain fat from animals  Examples are fatty meats, whole milk, butter, cream, and other dairy foods  It is also found in shortening, stick margarine, palm oil, and coconut oil  Trans fats are found in fried foods, crackers, chips, and baked goods made with margarine or shortening  Foods to include: With the DASH eating plan, you need to eat a certain number of servings from each food group  This will help you get enough of certain nutrients and limit others  The amount of servings you should eat depends on how many calories you need  Your dietitian can tell you how many calories you need  The number of servings listed next to the food groups below are for people who need about 2,000 calories each day  · Grains:  6 to 8 servings (3 of these servings should be whole-grain foods)    ? 1 slice of whole-grain bread     ? 1 ounce of dry cereal    ? ½ cup of cooked cereal, pasta, or brown rice    · Vegetables and fruits:  4 to 5 servings of fruits and 4 to 5 servings of vegetables    ? 1 medium fruit    ? ½ cup of frozen, canned (no added salt), or chopped fresh vegetables     ? ½ cup of fresh, frozen, dried, or canned fruit (canned in light syrup or fruit juice)    ? ½ cup of vegetable or fruit juice    · Dairy:  2 to 3 servings    ? 1 cup of nonfat (skim) or 1% milk    ? 1½ ounces of fat-free or low-fat cheese    ? 6 ounces of nonfat or low-fat yogurt    · Lean meat, poultry, and fish:  6 ounces or less    ? Poultry (chicken, turkey) with no skin    ? Fish (especially fatty fish, such as salmon, fresh tuna, or mackerel)    ?  Lean beef and pork (loin, round, extra lean hamburger)    ? Egg whites and egg substitutes    · Nuts, seeds, and legumes:  4 to 5 servings each week    ? ½ cup of cooked beans and peas    ? 1½ ounces of unsalted nuts    ? 2 tablespoons of peanut butter or seeds    · Sweets and added sugars:  5 or less each week    ? 1 tablespoon of sugar, jelly, or jam    ? ½ cup of sorbet or gelatin    ? 1 cup of lemonade    · Fats:  2 to 3 servings each week    ? 1 teaspoon of soft margarine or vegetable oil    ? 1 tablespoon of mayonnaise    ? 2 tablespoons of salad dressing    Foods to avoid:   · Grains:      ? Baked goods, such as doughnuts, pastries, cookies, and biscuits (high in fat and sugar)    ? Mixes for cornbread and biscuits, packaged foods, such as bread stuffing, rice and pasta mixes, macaroni and cheese, and instant cereals (high in sodium)    · Fruits and vegetables:      ? Regular, canned vegetables (high in sodium)    ? Sauerkraut, pickled vegetables, and other foods prepared in brine (high in sodium)    ? Fried vegetables or vegetables in butter or high-fat sauces    ? Fruit in cream or butter sauce (high in fat)    · Dairy:      ? Whole milk, 2% milk, and cream (high in fat)    ? Regular cheese and processed cheese (high in fat and sodium)    · Meats and protein foods:      ? Smoked or cured meat, such as corned beef, hallman, ham, hot dogs, and sausage (high in fat and sodium)    ? Canned beans and canned meats or spreads, such as potted meats, sardines, anchovies, and imitation seafood (high in sodium)    ? Deli or lunch meats, such as bologna, ham, turkey, and roast beef (high in sodium)    ? High-fat meat (T-bone steak, regular hamburger, and ribs)    ? Whole eggs and egg yolks (high in fat)    · Other:      ? Seasonings made with salt, such as garlic salt, celery salt, onion salt, seasoned salt, meat tenderizers, and monosodium glutamate (MSG)    ? Miso soup and canned or dried soup mixes (high in sodium)    ?  Regular soy sauce, barbecue sauce, teriyaki sauce, steak sauce, Worcestershire sauce, and most flavored vinegars (high in sodium)    ? Regular condiments, such as mustard, ketchup, and salad dressings (high in sodium)    ? Gravy and sauces, such as El or cheese sauces (high in sodium and fat)    ? Drinks high in sugar, such as soda or fruit drinks    ? Snack foods, such as salted chips, popcorn, pretzels, pork rinds, salted crackers, and salted nuts    ? Frozen foods, such as dinners, entrees, vegetables with sauces, and breaded meats (high in sodium)    Other guidelines to follow:   · Maintain a healthy weight  Your risk for heart disease is higher if you are overweight  Your healthcare provider may suggest that you lose weight if you are overweight  You can lose weight by eating fewer calories and foods that have added sugars and fat  The DASH meal plan can help you do this  Decrease calories by eating smaller portions at each meal and fewer snacks  Ask your healthcare provider for more information about how to lose weight  · Exercise regularly  Regular exercise can help you reach or maintain a healthy weight  Regular exercise can also help decrease your blood pressure and improve your cholesterol levels  Get 30 minutes or more of moderate exercise each day of the week  To lose weight, get at least 60 minutes of exercise  Talk to your healthcare provider about the best exercise program for you  · Limit alcohol  Women should limit alcohol to 1 drink a day  Men should limit alcohol to 2 drinks a day  A drink of alcohol is 12 ounces of beer, 5 ounces of wine, or 1½ ounces of liquor  © Copyright [address] Information is for End User's use only and may not be sold, redistributed or otherwise used for commercial purposes  All illustrations and images included in CareNotes® are the copyrighted property of [employer]  or [facility] Parviz Arreola   The above information is an  only   It is not intended as medical advice for individual conditions or treatments  Talk to your doctor, nurse or pharmacist before following any medical regimen to see if it is safe and effective for you

## 2021-01-21 ENCOUNTER — CONSULT (OUTPATIENT)
Dept: CARDIOLOGY CLINIC | Facility: CLINIC | Age: 51
End: 2021-01-21
Payer: COMMERCIAL

## 2021-01-21 VITALS
DIASTOLIC BLOOD PRESSURE: 60 MMHG | BODY MASS INDEX: 30.1 KG/M2 | SYSTOLIC BLOOD PRESSURE: 120 MMHG | WEIGHT: 215 LBS | HEIGHT: 71 IN | HEART RATE: 64 BPM

## 2021-01-21 DIAGNOSIS — I10 ESSENTIAL HYPERTENSION: ICD-10-CM

## 2021-01-21 DIAGNOSIS — R07.2 PRECORDIAL PAIN: ICD-10-CM

## 2021-01-21 DIAGNOSIS — F17.218 CIGARETTE NICOTINE DEPENDENCE WITH OTHER NICOTINE-INDUCED DISORDER: ICD-10-CM

## 2021-01-21 PROCEDURE — 99243 OFF/OP CNSLTJ NEW/EST LOW 30: CPT | Performed by: INTERNAL MEDICINE

## 2021-01-21 NOTE — PROGRESS NOTES
Consultation - Cardiology   Santana Bobo  52 y o  male MRN: [id number]  Unit/Bed#:  Encounter: [id number]  Physician Requesting Consult: No att  providers found  Reason for Consult / Principal Problem: Chest Pain    Assessment:  1  Atypical CP  2  HTN  3  Tobacco abuse    Plan:  His CP symptoms are atypical for myocardial ischemia  He does have risk factors including smoking, HTN, FH  His BP is controlled  I stressed to him that he must stop smoking  He will be scheduled for a regular EST  If negative, I will see him back on an as needed basis  History of Present Illness     HPI: Santana Bobo  is a 52y o  year old male who denies any past cardiac history  He has HTN, FH of CAD  He smokes 1/2 packs of cigarettes a day  He denies DM  On 1/11/2021, he awoke with sharp mid upper CP which lasted about one minute  He got up and drank some water  He denies any further CP but did go to the ER  ECG showed no ischemia  Troponin levels were normal     He is very active at work and does a lot of lifting  He denies any further CP  BP today 120/60  Review of Systems:    Alert awake oriented, comfortable, denies any complaints  No fevers chills nausea vomiting  No weakness, dizziness, seizures  no cough, shortness of breath, or wheezing  Denies any palpitations, chest pain, diaphoresis  Denies leg edema, pain or paresthesias  Denies any skin rashes  Denies abdominal pain, bloody stools, masses  Denies any depression or suicidal ideations      Historical Information   No past medical history on file    Past Surgical History:   Procedure Laterality Date    APPENDECTOMY      3 yrs old    KNEE SURGERY      TONSILLECTOMY      8 yrs old     Social History     Substance and Sexual Activity   Alcohol Use Not Currently     Social History     Substance and Sexual Activity   Drug Use Never     Social History     Tobacco Use   Smoking Status Current Every Day Smoker    Packs/day: 0 50    Types: Cigarettes Smokeless Tobacco Never Used     Family History: non-contributory    Meds/Allergies   all current active meds have been reviewed  Allergies   Allergen Reactions    Penicillins Anaphylaxis       Objective   Vitals: Blood pressure 120/60, pulse 64, height 5' 11" (1 803 m), weight 97 5 kg (215 lb)  , Body mass index is 29 99 kg/m²  ,   Weight (last 2 days)     Date/Time   Weight    01/21/21 1441   97 5 (215)                        Physical Exam:  GEN: Toya Carranza  appears well, alert and oriented x 3, pleasant and cooperative   HEENT: pupils equal, round, and reactive to light; extraocular muscles intact  NECK: supple, no carotid bruits   HEART: regular rhythm, normal S1 and S2, no murmurs, clicks, gallops or rubs   LUNGS: clear to auscultation bilaterally; no wheezes, rales, or rhonchi   ABDOMEN: normal bowel sounds, soft, no tenderness, no distention  EXTREMITIES: peripheral pulses normal; no clubbing, cyanosis, or edema  NEURO: no focal findings   SKIN: normal without suspicious lesions on exposed skin    Lab Results:   No visits with results within 1 Day(s) from this visit     Latest known visit with results is:   Admission on 01/11/2021, Discharged on 01/11/2021   Component Date Value Ref Range Status    WBC 01/11/2021 9 64  4 31 - 10 16 Thousand/uL Final    RBC 01/11/2021 4 48  3 88 - 5 62 Million/uL Final    Hemoglobin 01/11/2021 14 4  12 0 - 17 0 g/dL Final    Hematocrit 01/11/2021 43 2  36 5 - 49 3 % Final    MCV 01/11/2021 96  82 - 98 fL Final    MCH 01/11/2021 32 1  26 8 - 34 3 pg Final    MCHC 01/11/2021 33 3  31 4 - 37 4 g/dL Final    RDW 01/11/2021 11 5* 11 6 - 15 1 % Final    MPV 01/11/2021 12 0  8 9 - 12 7 fL Final    Platelets 15/00/0985 234  149 - 390 Thousands/uL Final    nRBC 01/11/2021 0  /100 WBCs Final    Neutrophils Relative 01/11/2021 43  43 - 75 % Final    Immat GRANS % 01/11/2021 1  0 - 2 % Final    Lymphocytes Relative 01/11/2021 36  14 - 44 % Final    Monocytes Relative 01/11/2021 9  4 - 12 % Final    Eosinophils Relative 01/11/2021 10* 0 - 6 % Final    Basophils Relative 01/11/2021 1  0 - 1 % Final    Neutrophils Absolute 01/11/2021 4 23  1 85 - 7 62 Thousands/µL Final    Immature Grans Absolute 01/11/2021 0 05  0 00 - 0 20 Thousand/uL Final    Lymphocytes Absolute 01/11/2021 3 46  0 60 - 4 47 Thousands/µL Final    Monocytes Absolute 01/11/2021 0 83  0 17 - 1 22 Thousand/µL Final    Eosinophils Absolute 01/11/2021 0 98* 0 00 - 0 61 Thousand/µL Final    Basophils Absolute 01/11/2021 0 09  0 00 - 0 10 Thousands/µL Final    Protime 01/11/2021 12 1  11 6 - 14 5 seconds Final    INR 01/11/2021 0 89  0 84 - 1 19 Final    PTT 01/11/2021 28  23 - 37 seconds Final    Therapeutic Heparin Range =  60-90 seconds    Sodium 01/11/2021 138  136 - 145 mmol/L Final    Potassium 01/11/2021 3 6  3 5 - 5 3 mmol/L Final    Chloride 01/11/2021 102  100 - 108 mmol/L Final    CO2 01/11/2021 27  21 - 32 mmol/L Final    ANION GAP 01/11/2021 9  4 - 13 mmol/L Final    BUN 01/11/2021 13  5 - 25 mg/dL Final    Creatinine 01/11/2021 1 03  0 60 - 1 30 mg/dL Final    Standardized to IDMS reference method    Glucose 01/11/2021 120  65 - 140 mg/dL Final    If the patient is fasting, the ADA then defines impaired fasting glucose as > 100 mg/dL and diabetes as > or equal to 123 mg/dL  Specimen collection should occur prior to Sulfasalazine administration due to the potential for falsely depressed results  Specimen collection should occur prior to Sulfapyridine administration due to the potential for falsely elevated results   Calcium 01/11/2021 9 0  8 3 - 10 1 mg/dL Final    AST 01/11/2021 15  5 - 45 U/L Final    Specimen collection should occur prior to Sulfasalazine administration due to the potential for falsely depressed results       ALT 01/11/2021 35  12 - 78 U/L Final    Specimen collection should occur prior to Sulfasalazine administration due to the potential for falsely depressed results   Alkaline Phosphatase 01/11/2021 116  46 - 116 U/L Final    Total Protein 01/11/2021 7 1  6 4 - 8 2 g/dL Final    Albumin 01/11/2021 3 8  3 5 - 5 0 g/dL Final    Total Bilirubin 01/11/2021 0 35  0 20 - 1 00 mg/dL Final    Use of this assay is not recommended for patients undergoing treatment with eltrombopag due to the potential for falsely elevated results   eGFR 01/11/2021 85  ml/min/1 73sq m Final    Lipase 01/11/2021 176  73 - 393 u/L Final    Total CK 01/11/2021 64  39 - 308 U/L Final    Troponin I 01/11/2021 <0 02  <=0 04 ng/mL Final    [employer] Chemistry analyzer 99% cutoff is > 0 04 ng/mL in network labs     o cTnI 99% cutoff is useful only when applied to patients in the clinical setting of myocardial ischemia   o cTnI 99% cutoff should be interpreted in the context of clinical history, ECG findings and possibly cardiac imaging to establish correct diagnosis  o cTnI 99% cutoff may be suggestive but clearly not indicative of a coronary event without the clinical setting of myocardial ischemia   Troponin I 01/11/2021 <0 02  <=0 04 ng/mL Final    Autovalidation override  S[employer] Chemistry analyzer 99% cutoff is > 0 04 ng/mL in network labs     o cTnI 99% cutoff is useful only when applied to patients in the clinical setting of myocardial ischemia   o cTnI 99% cutoff should be interpreted in the context of clinical history, ECG findings and possibly cardiac imaging to establish correct diagnosis  o cTnI 99% cutoff may be suggestive but clearly not indicative of a coronary event without the clinical setting of myocardial ischemia        Ventricular Rate 01/11/2021 63  BPM Final    Atrial Rate 01/11/2021 63  BPM Final    AZ Interval 01/11/2021 164  ms Final    QRSD Interval 01/11/2021 98  ms Final    QT Interval 01/11/2021 402  ms Final    QTC Interval 01/11/2021 411  ms Final    P Axis 01/11/2021 49  degrees Final    QRS Axis 01/11/2021 0  degrees Final    T Wave Axis 01/11/2021 48  degrees Final    Ventricular Rate 01/11/2021 71  BPM Final    Atrial Rate 01/11/2021 71  BPM Final    ND Interval 01/11/2021 158  ms Final    QRSD Interval 01/11/2021 102  ms Final    QT Interval 01/11/2021 384  ms Final    QTC Interval 01/11/2021 417  ms Final    P Axis 01/11/2021 78  degrees Final    QRS Axis 01/11/2021 -15  degrees Final    T Wave Axis 01/11/2021 54  degrees Final                       Imaging: I have personally reviewed pertinent reports

## 2021-01-23 ENCOUNTER — APPOINTMENT (OUTPATIENT)
Dept: LAB | Facility: CLINIC | Age: 51
End: 2021-01-23
Payer: MEDICARE

## 2021-01-23 ENCOUNTER — TRANSCRIBE ORDERS (OUTPATIENT)
Dept: LAB | Facility: CLINIC | Age: 51
End: 2021-01-23

## 2021-01-23 DIAGNOSIS — I10 ESSENTIAL HYPERTENSION: ICD-10-CM

## 2021-01-23 LAB
BILIRUB UR QL STRIP: NEGATIVE
CHOLEST SERPL-MCNC: 200 MG/DL (ref 50–200)
CLARITY UR: CLEAR
COLOR UR: NORMAL
EST. AVERAGE GLUCOSE BLD GHB EST-MCNC: 114 MG/DL
GLUCOSE UR STRIP-MCNC: NEGATIVE MG/DL
HBA1C MFR BLD: 5.6 %
HDLC SERPL-MCNC: 31 MG/DL
HGB UR QL STRIP.AUTO: NEGATIVE
KETONES UR STRIP-MCNC: NEGATIVE MG/DL
LDLC SERPL CALC-MCNC: 134 MG/DL (ref 0–100)
LEUKOCYTE ESTERASE UR QL STRIP: NEGATIVE
NITRITE UR QL STRIP: NEGATIVE
NONHDLC SERPL-MCNC: 169 MG/DL
PH UR STRIP.AUTO: 6.5 [PH]
PROT UR STRIP-MCNC: NEGATIVE MG/DL
SP GR UR STRIP.AUTO: 1.02 (ref 1–1.03)
TRIGL SERPL-MCNC: 176 MG/DL
TSH SERPL DL<=0.05 MIU/L-ACNC: 0.85 UIU/ML (ref 0.36–3.74)
UROBILINOGEN UR QL STRIP.AUTO: 0.2 E.U./DL

## 2021-01-23 PROCEDURE — 81003 URINALYSIS AUTO W/O SCOPE: CPT | Performed by: FAMILY MEDICINE

## 2021-01-23 PROCEDURE — 36415 COLL VENOUS BLD VENIPUNCTURE: CPT

## 2021-01-23 PROCEDURE — 84443 ASSAY THYROID STIM HORMONE: CPT

## 2021-01-23 PROCEDURE — 83036 HEMOGLOBIN GLYCOSYLATED A1C: CPT

## 2021-01-23 PROCEDURE — 80061 LIPID PANEL: CPT

## 2021-01-28 ENCOUNTER — HOSPITAL ENCOUNTER (OUTPATIENT)
Dept: NON INVASIVE DIAGNOSTICS | Facility: CLINIC | Age: 51
Discharge: HOME | End: 2021-01-28
Payer: MEDICARE

## 2021-01-28 DIAGNOSIS — F17.218 CIGARETTE NICOTINE DEPENDENCE WITH OTHER NICOTINE-INDUCED DISORDER: ICD-10-CM

## 2021-01-28 DIAGNOSIS — I10 ESSENTIAL HYPERTENSION: ICD-10-CM

## 2021-01-28 DIAGNOSIS — R07.2 PRECORDIAL PAIN: ICD-10-CM

## 2021-01-28 PROCEDURE — 93017 CV STRESS TEST TRACING ONLY: CPT

## 2021-01-28 PROCEDURE — 93018 CV STRESS TEST I&R ONLY: CPT | Performed by: INTERNAL MEDICINE

## 2021-01-28 PROCEDURE — 93016 CV STRESS TEST SUPVJ ONLY: CPT | Performed by: INTERNAL MEDICINE

## 2021-01-29 LAB
CHEST PAIN STATEMENT: NORMAL
MAX DIASTOLIC BP: 74 MMHG
MAX HEART RATE: 153 BPM
MAX PREDICTED HEART RATE: 172 BPM
MAX. SYSTOLIC BP: 168 MMHG
PROTOCOL NAME: NORMAL
REASON FOR TERMINATION: NORMAL
TARGET HR FORMULA: NORMAL
TEST INDICATION: NORMAL
TIME IN EXERCISE PHASE: NORMAL

## 2021-02-03 ENCOUNTER — TELEPHONE (OUTPATIENT)
Dept: CARDIOLOGY CLINIC | Facility: CLINIC | Age: 51
End: 2021-02-03

## 2021-02-03 NOTE — TELEPHONE ENCOUNTER
MD Abelardo Chiu             Tell him that his stress test was normal       Called & carolina/Efrain, advised of results

## 2021-02-15 PROBLEM — I10 ESSENTIAL HYPERTENSION: Status: ACTIVE | Noted: 2021-02-15

## 2021-03-31 ENCOUNTER — IMMUNIZATIONS (OUTPATIENT)
Dept: FAMILY MEDICINE CLINIC | Facility: HOSPITAL | Age: 51
End: 2021-03-31

## 2021-03-31 DIAGNOSIS — Z23 ENCOUNTER FOR IMMUNIZATION: Primary | ICD-10-CM

## 2021-03-31 PROCEDURE — 0001A SARS-COV-2 / COVID-19 MRNA VACCINE (PFIZER-BIONTECH) 30 MCG: CPT

## 2021-03-31 PROCEDURE — 91300 SARS-COV-2 / COVID-19 MRNA VACCINE (PFIZER-BIONTECH) 30 MCG: CPT

## 2021-04-16 ENCOUNTER — HOSPITAL ENCOUNTER (EMERGENCY)
Facility: HOSPITAL | Age: 51
Discharge: HOME | End: 2021-04-16
Attending: EMERGENCY MEDICINE | Admitting: EMERGENCY MEDICINE
Payer: MEDICARE

## 2021-04-16 VITALS
TEMPERATURE: 98.5 F | HEIGHT: 68 IN | OXYGEN SATURATION: 98 % | WEIGHT: 205 LBS | RESPIRATION RATE: 16 BRPM | DIASTOLIC BLOOD PRESSURE: 84 MMHG | BODY MASS INDEX: 31.07 KG/M2 | SYSTOLIC BLOOD PRESSURE: 152 MMHG | HEART RATE: 73 BPM

## 2021-04-16 DIAGNOSIS — S05.00XA CORNEAL ABRASION: Primary | ICD-10-CM

## 2021-04-16 PROCEDURE — 99283 EMERGENCY DEPT VISIT LOW MDM: CPT | Performed by: PHYSICIAN ASSISTANT

## 2021-04-16 PROCEDURE — 99282 EMERGENCY DEPT VISIT SF MDM: CPT

## 2021-04-16 RX ORDER — ERYTHROMYCIN 5 MG/G
OINTMENT OPHTHALMIC
Qty: 3.5 G | Refills: 0 | Status: SHIPPED | OUTPATIENT
Start: 2021-04-16

## 2021-04-16 RX ORDER — TETRACAINE HYDROCHLORIDE 5 MG/ML
1 SOLUTION OPHTHALMIC ONCE
Status: COMPLETED | OUTPATIENT
Start: 2021-04-16 | End: 2021-04-16

## 2021-04-16 RX ADMIN — FLUORESCEIN SODIUM 1 STRIP: 1 STRIP OPHTHALMIC at 10:50

## 2021-04-16 RX ADMIN — TETRACAINE HYDROCHLORIDE 1 DROP: 5 SOLUTION OPHTHALMIC at 10:50

## 2021-04-16 NOTE — ED PROVIDER NOTES
History  Chief Complaint   Patient presents with    Eye Pain     Patient reports left eye pain, swelling for 2 days with tearing  Denies blurry vision  Patient is a 61-year-old male who presents to the emergency department for evaluation left eye redness, watering and irritation  Patient states this started on Wednesday after he cut the grass outside  He is uncertain of whether anything flew into his eye at that time  He states that if he keeps his eye open for too long, there is a burning sensation in his eye, and his will water  He has taken anything for his symptoms at home  He denies any blurry vision  He does wear contact lenses  He denies fever or chills  History provided by:  Patient   used: No    Eye Pain  Associated symptoms: no abdominal pain, no chest pain, no cough, no diarrhea, no ear pain, no fever, no headaches, no nausea, no rash, no shortness of breath, no sore throat, no vomiting and no wheezing        Prior to Admission Medications   Prescriptions Last Dose Informant Patient Reported? Taking?   lisinopril (ZESTRIL) 10 mg tablet  Self No No   Sig: Take 1 tablet (10 mg total) by mouth daily   Patient taking differently: Take 5 mg by mouth daily Take 1/2 tablet daily      Facility-Administered Medications: None       History reviewed  No pertinent past medical history  Past Surgical History:   Procedure Laterality Date    APPENDECTOMY      3 yrs old   [facility]NEE SURGERY      TONSILLECTOMY      8 yrs old       History reviewed  No pertinent family history  I have reviewed and agree with the history as documented      E-Cigarette/Vaping    E-Cigarette Use Never User      E-Cigarette/Vaping Substances     Social History     Tobacco Use    Smoking status: Current Every Day Smoker     Packs/day: 0 50     Types: Cigarettes    Smokeless tobacco: Never Used   Substance Use Topics    Alcohol use: Not Currently    Drug use: Never       Review of Systems Constitutional: Negative for chills and fever  HENT: Negative for ear pain and sore throat  Eyes: Positive for pain and discharge  Negative for photophobia, redness and visual disturbance  Respiratory: Negative for cough, shortness of breath and wheezing  Cardiovascular: Negative for chest pain  Gastrointestinal: Negative for abdominal pain, diarrhea, nausea and vomiting  Genitourinary: Negative for dysuria and hematuria  Musculoskeletal: Negative for back pain, neck pain and neck stiffness  Skin: Negative for color change and rash  Neurological: Negative for dizziness, light-headedness and headaches  All other systems reviewed and are negative  Physical Exam  Physical Exam  Constitutional:       Appearance: Normal appearance  HENT:      Head: Normocephalic and atraumatic  Nose: Nose normal    Eyes:      General: Lids are normal  Lids are everted, no foreign bodies appreciated  Left eye: Discharge present  Pupils: Pupils are equal, round, and reactive to light  Left eye: Corneal abrasion present  Neck:      Musculoskeletal: Normal range of motion and neck supple  Musculoskeletal: Normal range of motion  Skin:     General: Skin is warm and dry  Neurological:      General: No focal deficit present  Mental Status: He is alert and oriented to person, place, and time           Vital Signs  ED Triage Vitals [04/16/21 0940]   Temperature Pulse Respirations Blood Pressure SpO2   98 5 °F (36 9 °C) 73 16 152/84 98 %      Temp Source Heart Rate Source Patient Position - Orthostatic VS BP Location FiO2 (%)   Temporal Monitor Lying Left arm --      Pain Score       6           Vitals:    04/16/21 0940   BP: 152/84   Pulse: 73   Patient Position - Orthostatic VS: Lying         Visual Acuity      ED Medications  Medications   tetracaine 0 5 % ophthalmic solution 1 drop (1 drop Left Eye Given by Other 4/16/21 1050)   fluorescein sodium sterile ophthalmic strip 1 strip (1 strip Left Eye Given by Other 4/16/21 1050)       Diagnostic Studies  Results Reviewed     None                 No orders to display              Procedures  Procedures         ED Course                                           MDM  Number of Diagnoses or Management Options  Corneal abrasion: new and requires workup  Diagnosis management comments: Patient presents for evaluation of left eye pain and watering since mowing grass 2 days ago  There is positive floor seen uptake in the 12 o'clock position of cornea consistent with 2 small corneal abrasions  I discussed this with the patient  The patient got significant relief of symptoms after tetracaine  Patient will be prescribed a course of erythromycin ointment  I advised if no resolution of symptoms within 2 days, he should follow up with his eye doctor for further evaluation  He acknowledged understanding  Patient is stable for discharge  Amount and/or Complexity of Data Reviewed  Decide to obtain previous medical records or to obtain history from someone other than the patient: yes  Review and summarize past medical records: yes    Risk of Complications, Morbidity, and/or Mortality  Presenting problems: low  Diagnostic procedures: low  Management options: low    Patient Progress  Patient progress: stable      Disposition  Final diagnoses:   Corneal abrasion     Time reflects when diagnosis was documented in both MDM as applicable and the Disposition within this note     Time User Action Codes Description Comment    4/16/2021 11:04 AM Patience Boogie Add [S05 00XA] Corneal abrasion       ED Disposition     ED Disposition Condition Date/Time Comment    Discharge Stable Fri Apr 16, 2021 11:04 AM Anita Castro  discharge to home/self care              Follow-up Information     Follow up With Specialties Details Why Contact Info Additional Information    Artur Bailey MD [facility] Schedule an appointment as soon as possible for a visit  As needed [address]  [phone]       Please follow-up with your eye doctor if no improvement within 2 days  In 2 days       [facility]  If symptoms worsen [address] Emergency Department, [address], [place], [place], [address]          Patient's Medications   Discharge Prescriptions    ERYTHROMYCIN (ILOTYCIN) OPHTHALMIC OINTMENT    Place a 1/2 inch ribbon of ointment into the lower eyelid 4 times a day for 5 days  Start Date: 4/16/2021 End Date: --       Order Dose: --       Quantity: 3 5 g    Refills: 0     No discharge procedures on file      PDMP Review     None          ED Provider  Electronically Signed by           José Antonio Jamison PA-C  04/16/21 2262

## 2021-04-24 ENCOUNTER — IMMUNIZATIONS (OUTPATIENT)
Dept: FAMILY MEDICINE CLINIC | Facility: HOSPITAL | Age: 51
End: 2021-04-24

## 2021-04-24 DIAGNOSIS — Z23 ENCOUNTER FOR IMMUNIZATION: Primary | ICD-10-CM

## 2021-04-24 PROCEDURE — 91300 SARS-COV-2 / COVID-19 MRNA VACCINE (PFIZER-BIONTECH) 30 MCG: CPT

## 2021-04-24 PROCEDURE — 0002A SARS-COV-2 / COVID-19 MRNA VACCINE (PFIZER-BIONTECH) 30 MCG: CPT

## 2022-01-29 ENCOUNTER — IMMUNIZATIONS (OUTPATIENT)
Dept: FAMILY MEDICINE CLINIC | Facility: HOSPITAL | Age: 52
End: 2022-01-29

## 2022-01-29 DIAGNOSIS — Z23 ENCOUNTER FOR IMMUNIZATION: Primary | ICD-10-CM

## 2022-01-29 PROCEDURE — 91300 COVID-19 PFIZER VACC 0.3 ML: CPT

## 2022-01-29 PROCEDURE — 0001A COVID-19 PFIZER VACC 0.3 ML: CPT

## 2022-04-26 ENCOUNTER — TELEPHONE (OUTPATIENT)
Dept: FAMILY MEDICINE CLINIC | Facility: OTHER | Age: 52
End: 2022-04-26

## 2022-08-04 ENCOUNTER — TELEPHONE (OUTPATIENT)
Dept: FAMILY MEDICINE CLINIC | Facility: OTHER | Age: 52
End: 2022-08-04

## 2022-08-12 NOTE — TELEPHONE ENCOUNTER
Letter mailed to patient re: calling office to set up appointment   Left multiple voicemails on patients machine to call back

## 2024-01-22 ENCOUNTER — HOSPITAL ENCOUNTER (EMERGENCY)
Facility: HOSPITAL | Age: 54
Discharge: HOME | End: 2024-01-22
Attending: EMERGENCY MEDICINE | Admitting: EMERGENCY MEDICINE
Payer: COMMERCIAL

## 2024-01-22 ENCOUNTER — TELEPHONE (OUTPATIENT)
Dept: FAMILY MEDICINE CLINIC | Facility: OTHER | Age: 54
End: 2024-01-22

## 2024-01-22 ENCOUNTER — APPOINTMENT (EMERGENCY)
Dept: RADIOLOGY | Facility: HOSPITAL | Age: 54
End: 2024-01-22
Payer: COMMERCIAL

## 2024-01-22 VITALS
TEMPERATURE: 98.4 F | HEART RATE: 70 BPM | RESPIRATION RATE: 16 BRPM | SYSTOLIC BLOOD PRESSURE: 120 MMHG | DIASTOLIC BLOOD PRESSURE: 92 MMHG | OXYGEN SATURATION: 100 %

## 2024-01-22 DIAGNOSIS — S46.001A INJURY OF RIGHT ROTATOR CUFF, INITIAL ENCOUNTER: ICD-10-CM

## 2024-01-22 DIAGNOSIS — M25.511 ACUTE PAIN OF RIGHT SHOULDER: Primary | ICD-10-CM

## 2024-01-22 PROCEDURE — 99284 EMERGENCY DEPT VISIT MOD MDM: CPT | Performed by: EMERGENCY MEDICINE

## 2024-01-22 PROCEDURE — 99283 EMERGENCY DEPT VISIT LOW MDM: CPT

## 2024-01-22 PROCEDURE — 73030 X-RAY EXAM OF SHOULDER: CPT

## 2024-01-22 RX ORDER — NAPROXEN 500 MG/1
500 TABLET ORAL 2 TIMES DAILY WITH MEALS
Qty: 14 TABLET | Refills: 0 | Status: SHIPPED | OUTPATIENT
Start: 2024-01-22 | End: 2024-01-29

## 2024-01-22 NOTE — ED PROVIDER NOTES
History  Chief Complaint   Patient presents with    Shoulder Injury     Pt presents to the ED with right shoulder injury. Pt reports falling on the ice on Saturday. Denies chest pain, denies head injury.      HPI    53-year-old male presenting for evaluation of pain in his right shoulder.  2 days ago the patient was shopping for a car when he slipped on black ice and fell, taking a direct blow to his right shoulder.  He did not hit his head or lose consciousness.  Reports aggressively worsening pain in the shoulder over the last couple of days.  The pain is present mainly with abduction of the right arm at the shoulder, not present when holding his arm against his chest or against his side.  Denies numbness or tingling in his fingers.  No neck pain or back pain.  Denies rib pain or difficulty breathing.  No prior history of shoulder injuries.    Prior to Admission Medications   Prescriptions Last Dose Informant Patient Reported? Taking?   erythromycin (ILOTYCIN) ophthalmic ointment   No No   Sig: Place a 1/2 inch ribbon of ointment into the lower eyelid 4 times a day for 5 days.   lisinopril (ZESTRIL) 10 mg tablet  Self No No   Sig: Take 1 tablet (10 mg total) by mouth daily   Patient taking differently: Take 5 mg by mouth daily Take 1/2 tablet daily      Facility-Administered Medications: None       No past medical history on file.    Past Surgical History:   Procedure Laterality Date    APPENDECTOMY      5 yrs old    KNEE SURGERY      TONSILLECTOMY      13 yrs old       No family history on file.  I have reviewed and agree with the history as documented.    E-Cigarette/Vaping    E-Cigarette Use Never User      E-Cigarette/Vaping Substances     Social History     Tobacco Use    Smoking status: Every Day     Current packs/day: 0.50     Types: Cigarettes    Smokeless tobacco: Never   Vaping Use    Vaping status: Never Used   Substance Use Topics    Alcohol use: Not Currently    Drug use: Never       Review of Systems    Respiratory:  Negative for shortness of breath.    Cardiovascular:  Negative for chest pain.   Gastrointestinal:  Negative for abdominal pain.   Musculoskeletal:  Positive for arthralgias (R shoulder pain). Negative for joint swelling, myalgias, neck pain and neck stiffness.   Skin:  Negative for wound.   Neurological:  Negative for dizziness and headaches.   All other systems reviewed and are negative.      Physical Exam  Physical Exam  Vitals reviewed.   Constitutional:       General: He is not in acute distress.     Appearance: Normal appearance. He is not ill-appearing or toxic-appearing.   HENT:      Head: Normocephalic and atraumatic.      Right Ear: External ear normal.      Left Ear: External ear normal.      Nose: Nose normal.   Eyes:      Conjunctiva/sclera: Conjunctivae normal.   Neck:      Comments: No midline tenderness to palpation over the cervical spine  Cardiovascular:      Rate and Rhythm: Normal rate.      Pulses: Normal pulses.   Pulmonary:      Effort: Pulmonary effort is normal. No respiratory distress.      Comments: No chest wall tenderness  Chest:      Chest wall: No tenderness.   Abdominal:      General: There is no distension.   Musculoskeletal:         General: No deformity. Normal range of motion.      Cervical back: Normal range of motion.      Comments: No midline TTP over the T or L spine.  Tenderness to palpation over the anterior and lateral aspect of the right shoulder.  Pain in the right shoulder with abduction of the right arm above approximately 30 degrees.  Normal range of motion of the right elbow and wrist without pain.  No swelling.    Flexion and extension is fully intact at the DIPs, PIPs, MCPs, and IP joints of the R hand. Flexion, extension, and lateral movements of the R wrist are intact. Opposition of the R thumb is intact. Pt is able to resist adduction and abduction of the fingers of the R hand.      Skin:     General: Skin is warm and dry.   Neurological:       General: No focal deficit present.      Mental Status: He is alert and oriented to person, place, and time.      Comments: Intact sensation to light touch in the medial, radial, and ulnar nerve distributions of the R hand   Psychiatric:         Mood and Affect: Mood normal.         Vital Signs  ED Triage Vitals   Temperature Pulse Respirations Blood Pressure SpO2   01/22/24 0824 01/22/24 0823 01/22/24 0823 01/22/24 0823 01/22/24 0823   98.4 °F (36.9 °C) 70 16 120/92 100 %      Temp Source Heart Rate Source Patient Position - Orthostatic VS BP Location FiO2 (%)   01/22/24 0824 01/22/24 0823 01/22/24 0823 01/22/24 0823 --   Oral Monitor Sitting Left arm       Pain Score       01/22/24 0823       8           Vitals:    01/22/24 0823   BP: 120/92   Pulse: 70   Patient Position - Orthostatic VS: Sitting         Visual Acuity      ED Medications  Medications - No data to display    Diagnostic Studies  Results Reviewed       None                   XR shoulder 2+ views RIGHT   ED Interpretation by Kelsey Rodriguez MD (01/22 0956)   No acute fracture or malalignment.       Final Result by Ne Narvaez MD (01/22 1142)      No acute displaced fracture or dislocation.      Incidentally noted are small lucencies measuring about 6 mm in the proximal humeral shaft, indeterminate may be artifactual due to overlapping shadows or a lucent lesion. These may be evaluated with a repeat humeral radiograph or with MRI to to exclude    any focal marrow lesion      The study was marked in EPIC for significant notification.      Resident: SHERWIN Ibarra I, the attending radiologist, have reviewed the images and agree with the final report above.      Workstation performed: [id number]                    Procedures  Procedures         ED Course  ED Course as of 01/22/24 1636   Mon Jan 22, 2024   0956 X-ray of the right shoulder with no acute fracture or malalignment.  History and exam most consistent with rotator cuff injury.  Will  discharge with referral to orthopedics in 7 to 10 days.  Return precautions discussed.  Proximal and prescribed for pain control.  Return precautions discussed.                                              Medical Decision Making  Amount and/or Complexity of Data Reviewed  Radiology: ordered and independent interpretation performed.    Risk  Prescription drug management.             Disposition  Final diagnoses:   Acute pain of right shoulder   Injury of right rotator cuff, initial encounter     Time reflects when diagnosis was documented in both MDM as applicable and the Disposition within this note       Time User Action Codes Description Comment    1/22/2024  9:57 AM Kelsey Rodriguez Add [M25.511] Acute pain of right shoulder     1/22/2024  9:57 AM Kelsey Rodriguez Add [S46.001A] Injury of right rotator cuff, initial encounter           ED Disposition       ED Disposition   Discharge    Condition   Stable    Date/Time   Mon Jan 22, 2024  9:57 AM    Comment   Efrain Sun Jr. discharge to home/self care.                   Follow-up Information       Follow up With Specialties Details Why Contact Info Additional Information    [facility] [place] [facility] Schedule an appointment as soon as possible for a visit in 1 week For further evaluation of your shoulder pain, which is suspected to be due to a rotator cuff injury. [address] [address]  [phone] [facility] [place], [facility] [address], [address], [place], [address]   [phone]            Discharge Medication List as of 1/22/2024  9:59 AM        START taking these medications    Details   naproxen (Naprosyn) 500 mg tablet Take 1 tablet (500 mg total) by mouth 2 (two) times a day with meals for 7 days, Starting Mon 1/22/2024, Until Mon 1/29/2024, Normal           CONTINUE these medications which have NOT CHANGED    Details   erythromycin (ILOTYCIN)  ophthalmic ointment Place a 1/2 inch ribbon of ointment into the lower eyelid 4 times a day for 5 days., Normal      lisinopril (ZESTRIL) 10 mg tablet Take 1 tablet (10 mg total) by mouth daily, Starting Fri 1/15/2021, Normal                 PDMP Review       None            ED Provider  Electronically Signed by             Kelsey Rodriguez MD  01/22/24 9172

## 2024-01-22 NOTE — TELEPHONE ENCOUNTER
Called patient and he will call us back to schedule a physical  he fell and is seeing orthopedic on the 25th and is unsure if he will need surgery plus he needs a MRI and per patient he needs to take care of this first         MD ENRIQUE [facility] Clerical; ENRIQUE [facility] Clinical  This patient needs office visit for AWV and is way past due for any type of office visit.  Last visit at [facility] was in 2021.  Please schedule a visit as soon as possible to ensure he is being seen regularly and having routine care done.  If he has switched PCP please update the chart and let me know.  Thank you.  Dr. Carpio

## 2024-01-22 NOTE — Clinical Note
Efrain Sun was seen and treated in our emergency department on 1/22/2024.                Diagnosis:     Efrain  .    He may return on this date:     Mr. Sun should not use his right arm at work until cleared by an orthopedics physician.     If you have any questions or concerns, please don't hesitate to call.      Kelsye Rodriguez MD    ______________________________           _______________          _______________  Hospital Representative                              Date                                Time

## 2024-01-25 ENCOUNTER — OFFICE VISIT (OUTPATIENT)
Dept: OBGYN CLINIC | Facility: CLINIC | Age: 54
End: 2024-01-25
Payer: COMMERCIAL

## 2024-01-25 VITALS
HEIGHT: 68 IN | BODY MASS INDEX: 31.07 KG/M2 | SYSTOLIC BLOOD PRESSURE: 155 MMHG | WEIGHT: 205 LBS | DIASTOLIC BLOOD PRESSURE: 91 MMHG | HEART RATE: 72 BPM

## 2024-01-25 DIAGNOSIS — R29.898 WEAKNESS OF SHOULDER: ICD-10-CM

## 2024-01-25 DIAGNOSIS — S46.001D INJURY OF RIGHT ROTATOR CUFF, SUBSEQUENT ENCOUNTER: ICD-10-CM

## 2024-01-25 DIAGNOSIS — M89.9 BONE LESION: ICD-10-CM
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
## 2024-01-25 DIAGNOSIS — M25.511 ACUTE PAIN OF RIGHT SHOULDER: Primary | ICD-10-CM

## 2024-01-25 PROCEDURE — 99203 OFFICE O/P NEW LOW 30 MIN: CPT | Performed by: PHYSICIAN ASSISTANT

## 2024-01-25 NOTE — LETTER
January 25, 2024     Patient: Efrain Sun Jr.  YOB: 1970  Date of Visit: 1/25/2024      To Whom it May Concern:    Efrain Sun is under my professional care. Efrian was seen in my office on 1/25/2024. Efrain will be on light duty restrictions: No use right arm.  If light duty is unavailable patient would be out of work until his next evaluation with orthopedic surgeon.    If you have any questions or concerns, please don't hesitate to call.         Sincerely,          Azael Yousif PA-C        CC: No Recipients

## 2024-01-25 NOTE — PATIENT INSTRUCTIONS
Rotator Cuff Tear Repair   AMBULATORY CARE:   What you need to know about rotator cuff tear repair:  Rotator cuff repair is surgery to fix a tear in one or more of your rotator cuff tendons. A tendon is a cord of tough tissue that connects your muscles to your bones. The rotator cuff is made up of a group of muscles and tendons that hold the shoulder joint in place.       How to prepare for surgery:   Your surgeon will tell you how to prepare for surgery. Tell your surgeon about all medicines you take. Include prescription and over-the-counter medicines. You may need to stop taking some medicines days or weeks before surgery. Your surgeon will give you specific instructions to follow.    Arrange to have someone drive you home and stay with you to make sure you are okay. You may be given antibiotics to prevent an infection caused by bacteria. You may be given general anesthesia to keep you asleep and free from pain during surgery. You may instead be given anesthesia to make your shoulder area numb. Tell your surgeon if you have ever had an allergic reaction to antibiotics or anesthesia.    Your surgeon may tell you not to eat or drink anything after midnight on the day of your surgery. He or she will tell you which medicines to take or not take on the day of your surgery.    What will happen during surgery:  The tear can be repaired in several ways. Your surgeon will talk to you about the different kinds of surgery. He or she will tell you the kind that is best for you. This will mainly depend on the kind of tear you have and if it is severe.  For an open repair or a mini-open repair , your surgeon will make an incision in your shoulder. An open repair may be needed if the tear is large or you need a tendon transfer. You may be able to have a mini-open repair if the tear is small and your surgeon can repair it easily. For either kind of open repair, your surgeon will examine the tendons.     For arthroscopy , your  surgeon will make small incisions in your shoulder. He or she will put an arthroscope (a camera) into the joint. The camera will show pictures of your joint on a monitor. Your surgeon will put tools into other small incisions in the area. He or she will use the tools to repair the rotator cuff tear.    Your surgeon may need to do several things during any kind of surgery. He or she may remove damaged tissue, bone spurs, or a swollen bursa (small sac of fluid around the joint). If the rotator cuff tear is small, he or she may sew it back together. If the tear is big, he or she may attach the tendon to the bone in your shoulder. This can be done with stitches and bone anchors to hold the tendon to the bone. A graft (a piece of another muscle or tissue) may be used to attach and repair the tendon. Your surgeon may reshape your shoulder bone to help it stay in place.    Stitches may be used to close the incision. The wound will then be covered with a bandage.    What to expect after surgery:   You should expect to feel pain after surgery. This is normal and should get better soon. You will be given pain medicine. Your healthcare provider may also put ice or a cold pack on your shoulder. This treatment may decrease pain, swelling, and muscle spasms.    A sling may be placed on your arm to keep it from moving. A pillow attached to the sling holds your arm away from your body. This position decreases pressure on the surgery area. It also helps blood flow to the area to help it heal.    Risks of rotator cuff surgery:  You may get an infection from this surgery or bleed more than expected. You may have nerve damage. If you had an open repair, the muscle that was detached at the beginning may not reattach correctly. Rotator cuff surgery may cause muscles in your shoulder to become weak. This can decrease your ability to use your shoulder. Your tendon may not heal, and your arm and shoulder may be weak and painful. If a graft  was used for your repair, you may have swelling and get an infection around the graft. After surgery, tissue around the joint can swell and become stiff. This can cause new pain and problems with moving your arm. The anchor used to fix your tear may press on the tendon and cause pain. Your tendon may tear after surgery. You may need to have surgery again if the new tear causes pain or movement problems.  Call your local emergency number (911 in the [place]) if:   You suddenly have shortness of breath.    You have sudden numbness or tingling down your arm.    Seek care immediately if:   The stitches on your shoulder wound come apart.    You have new shoulder pain that does not go away, even after you take pain medicine.    Your surgery area is swollen, red, or has pus coming from it.    Call your surgeon or orthopedist if:   You have chills, a cough, or feel weak and achy.    You have a fever.    You have stiffness or pain in your shoulder that is worse and makes you stop your physical therapy.    You are vomiting.    Your skin is itchy, swollen, or has a rash.    You have questions or concerns about your condition or care.    Medicines:  You may need any of the following:  Antibiotics  help prevent or treat an infection caused by bacteria.    Prescription pain medicine  may be given. Ask your healthcare provider how to take this medicine safely. Some prescription pain medicines contain acetaminophen. Do not take other medicines that contain acetaminophen without talking to your healthcare provider. Too much acetaminophen may cause liver damage. Prescription pain medicine may cause constipation. Ask your healthcare provider how to prevent or treat constipation.     NSAIDs  help decrease swelling and pain or fever. This medicine is available with or without a doctor's order. NSAIDs can cause stomach bleeding or kidney problems in certain people. If you take blood thinner medicine, always ask your healthcare provider if  NSAIDs are safe for you. Always read the medicine label and follow directions.    Take your medicine as directed.  Contact your healthcare provider if you think your medicine is not helping or if you have side effects. Tell your provider if you are allergic to any medicine. Keep a list of the medicines, vitamins, and herbs you take. Include the amounts, and when and why you take them. Bring the list or the pill bottles to follow-up visits. Carry your medicine list with you in case of an emergency.    Care for your incision area:  Keep the area clean and dry. Cover it with plastic before you take a shower. Do not take a bath or swim until your surgeon tells you it is okay. Check the area every day for signs of infection, such as swelling, red streaks, or pus. A fever may also be a sign of infection.  Ask about activity:  Your surgeon will tell you when it is okay to use your shoulder and arm. Until then, do not use your arm to lift anything. Do not use your arm to move around, push yourself up from lying down, or to change positions. Your surgeon will tell you when it is okay to drive, go back to work, and do your daily activities.  Apply ice to your shoulder:  Ice may decrease pain, swelling, and muscle spasms. Use an ice pack, or put crushed ice in a plastic bag. Cover it with a towel and place it on your shoulder for 15 to 20 minutes every hour or as directed. Ice may be helpful for 10 to 14 days after surgery.  Use a sling as directed:  You may need to use an abduction immobilizer sling for 4 to 6 weeks after surgery. The amount of time will depend on how severe your tear was. The sling will prevent arm movement while the tendon heals. A pillow attached to the sling holds your arm away from your body. This position decreases pressure on your wound, and helps blood flow to the surgery area. Your healthcare provider will tell you how long to use the sling each day.  Go to physical therapy for as long as directed:   Physical therapy is done in stages. Each stage helps increase motion or strength, and helps relieve or prevent stiffness. Your surgeon and physical therapist will create a plan for your therapy. The plan will be based on your age, health, and recovery goals. The following are general guidelines:  Stage 1 is the first 4 to 8 weeks after surgery.  Physical therapy may begin soon after surgery. At first, a physical therapist will work with you to do safe exercises that will allow your rotator cuff to heal. This is called passive exercise because your physical therapist will be moving your arm for you. After a few weeks, the therapist may suggest therapy in a pool. The water allows you to move your arm with very little stress on your shoulder. The water also provides resistance. This helps increase muscle strength.    Stage 2 continues to weeks 8 to 12.  At this stage, your physical therapist may have you start doing active exercises. These are exercises you will do without the help of your physical therapist. Exercises include using your shoulder more. You will begin exercises such as raising and stretching your arm. Do only those exercises that you have been shown. Do them only as often as you are told to do them.    Stage 3 continues to weeks 12 to 16.  At this stage, you will begin doing exercises that make your shoulder muscles stronger. Your physical therapy will focus on range of motion (ROM) without pain. You will also increase endurance. Your physical therapist will help you stretch and strengthen your shoulder. You may be shown how to use resistance bands. At this stage, you can expect to have about 80% to 90% of your ROM again. This will depend on the type of tear you had repaired. You will need to be able to do strengthening exercises and daily activities without pain to move to the next phase.    Stage 4 continues to weeks 16 to 26.  This stage is for advanced strengthening. Exercises will target specific  areas of your shoulder. You will still not be able to lift your arm over your head or play sports in this stage. Your physical therapist will help you build the strength and stability needed for these activities. The goal is to have full ROM that is the same in both arms, and no pain at rest or during activities. These will be needed before you can return to sports or overhead lifting. It may take up to a year to return to all of your regular daily activities after you have a rotator cuff tear repair.    Follow up with your surgeon or orthopedist as directed:  Ask when you should return to have your stitches taken out. Write down your questions so you remember to ask them during your visits.  © Copyright [employer] 2023 Information is for End User's use only and may not be sold, redistributed or otherwise used for commercial purposes.  The above information is an  only. It is not intended as medical advice for individual conditions or treatments. Talk to your doctor, nurse or pharmacist before following any medical regimen to see if it is safe and effective for you.

## 2024-01-25 NOTE — PROGRESS NOTES
Orthopaedic Surgery - Office Note  Efrain Sun Jr. (53 y.o. male)   : 1970   MRN: [id number]  Encounter Date: 2024    Chief Complaint   Patient presents with    Right Shoulder - Pain         Assessment/Plan  Diagnoses and all orders for this visit:    Acute pain of right shoulder  -     MRI shoulder right wo contrast; Future  -     Ambulatory Referral to Orthopedic Surgery; Future  -     Ambulatory Referral to Physical Therapy; Future    Fall due to slipping on ice or snow, subsequent encounter  -     MRI shoulder right wo contrast; Future  -     Ambulatory Referral to Orthopedic Surgery; Future  -     Ambulatory Referral to Physical Therapy; Future    Weakness of shoulder  -     MRI shoulder right wo contrast; Future  -     Ambulatory Referral to Orthopedic Surgery; Future  -     Ambulatory Referral to Physical Therapy; Future    Bone lesion-small lucencies measuring about 6 mm in the proximal humeral shaft  -     MRI shoulder right wo contrast; Future  -     Ambulatory Referral to Orthopedic Surgery; Future  -     Ambulatory Referral to Physical Therapy; Future    Injury of right rotator cuff, subsequent encounter  -     Ambulatory Referral to Orthopedic Surgery    The diagnosis as well as treatment options were reviewed with the patient in the office today.  With his history of trauma and inability to lift the arm overhead there is concern for any acute full-thickness rotator cuff tear for which I would recommend an MRI.  In addition, the MRI will help with look at the bone lesions noted by radiologist on plain films.  He will ice the shoulder for comfort 20 minutes on 1 hour off 3 times a day.  He will be started in physical therapy to work on his range of motion.  He was encouraged to elevate the hand above the level of the heart to help with edema control.  He will continue the naproxen with food for pain and inflammation.  He may use Tylenol as needed for breakthrough discomfort.  He will  "ice the shoulder 20 minutes on 1 hour off 3 times a day.  Patient will be on light duty restrictions with no use of the right arm     Return for Recheck with orthopedic shoulder surgeon to discuss MRI.        History of Present Illness  This is a new patient with right shoulder pain.  Patient had a fall on 1/20/2024 while shopping for car he slipped on ice landing on his right shoulder.  He has had pain and discomfort since that time that worsened to the point he went to the emergency department on 1/22/2024.  X-rays were negative for fracture but did show \"small lucencies in the proximal humerus shaft\".  He has had difficulty lifting his arm overhead since that time.  No paresthesias or neck pain are reported.  He is right-hand dominant.  He denies any contributing medical history.  He denies any problems with the shoulder prior to the fall.  He reports he has filed a claim with the owner of the property.  He is an everyday smoker.  He denies any head trauma at the time of the fall.    Review of Systems  Pertinent items are noted in HPI.  All other systems were reviewed and are negative.    Physical Exam  /91 (BP Location: Left arm, Patient Position: Sitting, Cuff Size: Large)   Pulse 72   Ht 5' 8\" (1.727 m)   Wt 93 kg (205 lb)   BMI 31.17 kg/m²   Cons: Appears well.  No apparent distress.  Psych: Alert. Oriented x3.  Mood and affect normal.  Right shoulder is without skin breakdown lesion or signs of infection.  Patient reports he is in too much discomfort to participate much in physical exam.  He has active forward flexion of the right shoulder is only to about 30 degrees.  He will not allow passive forward flexion beyond 60 degrees.  He is neurovascularly grossly intact in the right upper extremity.  Internal rotation and external rotation strength is decreased to 4 out of 5.  I am unable to assess empty can testing as patient will not allow forward flexion to 90 degrees.  He has no AC joint " tenderness.  There is no appreciable Forest deformity.  He is nontender in the bicipital groove.  There are no gross neurological deficits appreciated in the right upper extremity               Studies Reviewed  RIGHT SHOULDER     INDICATION:   Fall on ice. Patient presents with right shoulder pain following fall on ice, onset 2 days ago     COMPARISON:  None     VIEWS:  XR SHOULDER 2+ VW RIGHT        FINDINGS:     There is no acute fracture or dislocation.     Small lucencies seen within the proximal humerus seen only on the AP view with internal and not visible on the additional views.  No lytic or blastic osseous lesion.     Soft tissues are unremarkable.     IMPRESSION:     No acute displaced fracture or dislocation.     Incidentally noted are small lucencies measuring about 6 mm in the proximal humeral shaft, indeterminate may be artifactual due to overlapping shadows or a lucent lesion. These may be evaluated with a repeat humeral radiograph or with MRI to to exclude   any focal marrow lesion     The study was marked in EPIC for significant notification.     Resident: SHERWIN Ibarra I, the attending radiologist, have reviewed the images and agree with the final report above.     Workstation performed: [id number]  X-ray images as well as reports were reviewed by myself in the office today and I agree with radiologist interpretation.    X-ray images as well as reports were reviewed by myself in the office today and I agree with radiologist interpretation.  Emergency department notes were reviewed by myself in the office today.      Procedures  No procedures today.    Medical, Surgical, Family, and Social History  The patient's medical history, family history, and social history, were reviewed and updated as appropriate.    History reviewed. No pertinent past medical history.    Past Surgical History:   Procedure Laterality Date    APPENDECTOMY      5 yrs old    KNEE SURGERY      TONSILLECTOMY      13 yrs old        Family History   Problem Relation Age of Onset    Dementia Mother         passed away       Social History     Occupational History    Not on file   Tobacco Use    Smoking status: Every Day     Current packs/day: 0.50     Types: Cigarettes    Smokeless tobacco: Never   Vaping Use    Vaping status: Never Used   Substance and Sexual Activity    Alcohol use: Not Currently    Drug use: Never    Sexual activity: Not on file       Allergies   Allergen Reactions    Penicillins Anaphylaxis         Current Outpatient Medications:     erythromycin (ILOTYCIN) ophthalmic ointment, Place a 1/2 inch ribbon of ointment into the lower eyelid 4 times a day for 5 days., Disp: 3.5 g, Rfl: 0    lisinopril (ZESTRIL) 10 mg tablet, Take 1 tablet (10 mg total) by mouth daily (Patient taking differently: Take 5 mg by mouth daily Take 1/2 tablet daily), Disp: 30 tablet, Rfl: 2    naproxen (Naprosyn) 500 mg tablet, Take 1 tablet (500 mg total) by mouth 2 (two) times a day with meals for 7 days, Disp: 14 tablet, Rfl: 0      Azael Yousif PA-C

## 2024-01-29 ENCOUNTER — EVALUATION (OUTPATIENT)
Dept: PHYSICAL THERAPY | Facility: CLINIC | Age: 54
End: 2024-01-29
Payer: COMMERCIAL
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
## 2024-01-29 DIAGNOSIS — M89.9 BONE LESION: ICD-10-CM

## 2024-01-29 DIAGNOSIS — M25.511 ACUTE PAIN OF RIGHT SHOULDER: ICD-10-CM

## 2024-01-29 DIAGNOSIS — R29.898 WEAKNESS OF SHOULDER: ICD-10-CM

## 2024-01-29 PROCEDURE — 97110 THERAPEUTIC EXERCISES: CPT | Performed by: PHYSICAL THERAPIST

## 2024-01-29 PROCEDURE — 97161 PT EVAL LOW COMPLEX 20 MIN: CPT | Performed by: PHYSICAL THERAPIST

## 2024-01-29 PROCEDURE — 97140 MANUAL THERAPY 1/> REGIONS: CPT | Performed by: PHYSICAL THERAPIST

## 2024-01-29 NOTE — PROGRESS NOTES
PT Evaluation     Today's date: 2024  Patient name: Efrain Sun Jr.  : 1970  MRN: [id number]  Referring provider: Azael Yousif PA*  Dx:   Encounter Diagnosis     ICD-10-CM    1. Acute pain of right shoulder  M25.511 Ambulatory Referral to Physical Therapy      2. Fall due to slipping on ice or snow, subsequent encounter  W00.9XXD Ambulatory Referral to Physical Therapy      3. Weakness of shoulder  R29.898 Ambulatory Referral to Physical Therapy      4. Bone lesion-small lucencies measuring about 6 mm in the proximal humeral shaft  M89.9 Ambulatory Referral to Physical Therapy                     Assessment  Assessment details: Pt presents with s/s consistent with a possible acute R rotator cuff tear however patient's extremely limited ROM makes clinical exam equivocal pending MRI. He demonstrates severe impairments of R shoulder > elbow ROM, strength, edema, arthrokinematics, and pain as well as severe guarding and very limited tolerance to gentle ROM. He will benefit from skilled PT services to address his impairments in order to decrease pain, restore function, and return to work.  Impairments: abnormal muscle firing, abnormal or restricted ROM, activity intolerance, impaired physical strength, lacks appropriate home exercise program, pain with function, scapular dyskinesis, weight-bearing intolerance, poor posture  and poor body mechanics  Understanding of Dx/Px/POC: good   Prognosis: poor    Goals  STG - 4 wks  1) pt will demonstrate 60 deg active FE  2) pt will improve pain 25%    MTG - 8 wks  1) pt will demonstrate 90 deg active FE  2) pt will improve pain 50%    LTG - 12 wks  1) pt will demonstrate 120 deg active FE  2) pt will return to work    Plan  Planned modality interventions: low level laser therapy  Planned therapy interventions: joint mobilization, manual therapy, neuromuscular re-education, patient education, postural training, strengthening, stretching, therapeutic  activities, therapeutic exercise, home exercise program, functional ROM exercises, flexibility and body mechanics training  Frequency: 2x week  Duration in weeks: 12  Treatment plan discussed with: family and patient      Subjective Evaluation    History of Present Illness  Date of onset: 2024  Mechanism of injury: trauma  Mechanism of injury: Pt is a 53 y.o. male with PMHx significant for HTN, R knee sx. He reports sudden onset of R shoulder and scapular pain 1 day after slipping and falling on ice and landing on his posterior R shoulder. He currently reports severe pain at rest and inability to move his R arm at the shoulder.    Patient Goals  Patient goals for therapy: decreased pain, increased motion, return to work, independence with ADLs/IADLs, decreased edema and increased strength    Pain  Current pain ratin  At best pain ratin  At worst pain rating: 10  Location: lateral R shoulder, posterior scapula  Quality: sharp and throbbing  Relieving factors: medications  Exacerbated by: movement, sleeping.  Progression: no change    Social Support    Employment status: working ([employer])    Diagnostic Tests  X-ray: abnormal (small proximal humerus lucencies noted, no fx or significant degenerative changes)  Treatments  No previous or current treatments      Objective     Observations     Right Shoulder  Positive for edema (R hand).     Active Range of Motion     Right Shoulder   Flexion: 10 degrees with pain  Abduction: 15 degrees with pain    Right Elbow   Flexion: 115 degrees   Extension: 10 degrees     Passive Range of Motion     Right Shoulder   Flexion: 25 degrees   Abduction: 20 degrees   External rotation 0°: -10 degrees     Right Elbow   Flexion: 135 degrees   Extension: 5 degrees     Additional Passive Range of Motion Details  O: severe guarding in all planes noted             Precautions: PMHx: HTN, R knee sx  Dx: acute R shoulder pain s/p fall, possible R RTC  "tear    Manuals 1/29            R shoulder FF PROM 10\"x5            R shoulder ABD PROM 10\"x5            R shoulder ER PROM 10\"x5            R elbow PROM 10\"x5            Laser R shoulder                          Neuro Re-Ed                                                                                                        Ther Ex             R UT stretch 10\"x3            R elbow AROM 1x10            R wrist rolls 1x20            R finger AROM 1x20            Static pendulums 10\"x5            Table slides 1x10            Pulleys FF             Pulleys ABD             ER AAROM                                       Ther Activity                                       Gait Training                                       Modalities                                            "

## 2024-01-31 ENCOUNTER — OFFICE VISIT (OUTPATIENT)
Dept: PHYSICAL THERAPY | Facility: CLINIC | Age: 54
End: 2024-01-31
Payer: COMMERCIAL

## 2024-01-31 DIAGNOSIS — M25.511 ACUTE PAIN OF RIGHT SHOULDER: Primary | ICD-10-CM
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
## 2024-01-31 DIAGNOSIS — R29.898 WEAKNESS OF SHOULDER: ICD-10-CM

## 2024-01-31 PROCEDURE — 97110 THERAPEUTIC EXERCISES: CPT | Performed by: PHYSICAL THERAPIST

## 2024-01-31 PROCEDURE — 97140 MANUAL THERAPY 1/> REGIONS: CPT | Performed by: PHYSICAL THERAPIST

## 2024-01-31 NOTE — PROGRESS NOTES
"Daily Note     Today's date: 2024  Patient name: Efrain Sun Jr.  : 1970  MRN: [id number]  Referring provider: Azael Yousif PA*  Dx:   Encounter Diagnosis     ICD-10-CM    1. Acute pain of right shoulder  M25.511       2. Fall due to slipping on ice or snow, subsequent encounter  W00.9XXD       3. Weakness of shoulder  R29.898                      Subjective: Pt reports 10/10 R shoulder pain since starting PT.    Objective: See treatment diary below  PROM ER 19 deg, ABD 30 deg, FF 40 deg    Assessment: Pt demonstrated improved ER > FF > ABD.    Plan: Cont. POC.     Precautions: PMHx: HTN, R knee sx  Dx: acute R shoulder pain s/p fall, possible R RTC tear    Manuals            R shoulder FF PROM 10\"x5 10\"x10           R shoulder ABD PROM 10\"x5 10\"x10           R shoulder ER PROM 10\"x5 10\"x10           R elbow PROM 10\"x5            Laser R shoulder                          Neuro Re-Ed                                                                                                        Ther Ex             R UT stretch 10\"x3 10\"x3           R elbow AROM 1x10 1x10           R wrist rolls 1x20 1x20           R finger AROM 1x20 1x20           Static pendulums 10\"x5 10\"x10           Table slides 1x10 1x10           Pulleys scaption  5\"x5                        ER AAROM  5\"x5                                     Ther Activity                                         Gait Training                                       Modalities                                            "

## 2024-02-01 ENCOUNTER — HOSPITAL ENCOUNTER (OUTPATIENT)
Dept: RADIOLOGY | Facility: HOSPITAL | Age: 54
Discharge: HOME | End: 2024-02-01
Payer: COMMERCIAL

## 2024-02-01 DIAGNOSIS — R29.898 WEAKNESS OF SHOULDER: ICD-10-CM
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
## 2024-02-01 DIAGNOSIS — M25.511 ACUTE PAIN OF RIGHT SHOULDER: ICD-10-CM

## 2024-02-01 DIAGNOSIS — M89.9 BONE LESION: ICD-10-CM

## 2024-02-01 PROCEDURE — 73221 MRI JOINT UPR EXTREM W/O DYE: CPT

## 2024-02-01 PROCEDURE — G1004 CDSM NDSC: HCPCS

## 2024-02-05 ENCOUNTER — OFFICE VISIT (OUTPATIENT)
Dept: PHYSICAL THERAPY | Facility: CLINIC | Age: 54
End: 2024-02-05
Payer: COMMERCIAL

## 2024-02-05 DIAGNOSIS — M25.511 ACUTE PAIN OF RIGHT SHOULDER: Primary | ICD-10-CM

## 2024-02-05 DIAGNOSIS — R29.898 WEAKNESS OF SHOULDER: ICD-10-CM

## 2024-02-05 DIAGNOSIS — M89.9 BONE LESION: ICD-10-CM
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
## 2024-02-05 PROCEDURE — 97110 THERAPEUTIC EXERCISES: CPT

## 2024-02-05 PROCEDURE — 97140 MANUAL THERAPY 1/> REGIONS: CPT

## 2024-02-05 NOTE — PROGRESS NOTES
"Daily Note     Today's date: 2024  Patient name: Efrain Sun Jr.  : 1970  MRN: [id number]  Referring provider: Azael Yousif PA*  Dx:   Encounter Diagnosis     ICD-10-CM    1. Acute pain of right shoulder  M25.511       2. Fall due to slipping on ice or snow, subsequent encounter  W00.9XXD       3. Weakness of shoulder  R29.898       4. Bone lesion-small lucencies measuring about 6 mm in the proximal humeral shaft  M89.9           Start Time: 1000  Stop Time: 1032  Total time in clinic (min): 32 minutes    Subjective: Patient notes, \"6/10\" R shoulder pain prior to therapy.     Objective: See treatment diary below    Assessment: Patient had slightly better tolerance to therapy this visit. He still demonstrates significant PROM restrictions with FF being the most restricted and painful.    Plan: Cont. POC.     Precautions: PMHx: HTN, R knee sx  Dx: acute R shoulder pain s/p fall, possible R RTC tear    Manuals           R shoulder FF PROM 10\"x5 10\"x10 10\"x10          R shoulder ABD PROM 10\"x5 10\"x10 10\"x10          R shoulder ER PROM 10\"x5 10\"x10 10\"x10          R elbow PROM 10\"x5            Laser R shoulder                          Neuro Re-Ed                                                                                                        Ther Ex             R UT stretch 10\"x3 10\"x3 10\"x3          R elbow AROM 1x10 1x10 1x10          R wrist rolls 1x20 1x20 1x20          R finger AROM 1x20 1x20 1x20          Static pendulums 10\"x5 10\"x10 10\"x10          Table slides 1x10 1x10 1x10          Pulleys scaption  5\"x5 5\"x5                       ER AAROM  5\"x5 5\"x5                                    Ther Activity                                         Gait Training                                       Modalities                                          "

## 2024-02-07 ENCOUNTER — APPOINTMENT (OUTPATIENT)
Dept: PHYSICAL THERAPY | Facility: CLINIC | Age: 54
End: 2024-02-07
Payer: COMMERCIAL

## 2024-02-13 ENCOUNTER — APPOINTMENT (OUTPATIENT)
Dept: PHYSICAL THERAPY | Facility: CLINIC | Age: 54
End: 2024-02-13
Payer: COMMERCIAL

## 2024-02-14 ENCOUNTER — APPOINTMENT (OUTPATIENT)
Dept: PHYSICAL THERAPY | Facility: CLINIC | Age: 54
End: 2024-02-14
Payer: COMMERCIAL

## 2024-02-15 ENCOUNTER — OFFICE VISIT (OUTPATIENT)
Dept: PHYSICAL THERAPY | Facility: CLINIC | Age: 54
End: 2024-02-15
Payer: COMMERCIAL

## 2024-02-15 DIAGNOSIS — M25.511 ACUTE PAIN OF RIGHT SHOULDER: Primary | ICD-10-CM

## 2024-02-15 DIAGNOSIS — R29.898 WEAKNESS OF SHOULDER: ICD-10-CM
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
## 2024-02-15 DIAGNOSIS — M89.9 BONE LESION: ICD-10-CM

## 2024-02-15 PROCEDURE — 97110 THERAPEUTIC EXERCISES: CPT | Performed by: PHYSICAL THERAPIST

## 2024-02-15 PROCEDURE — 97140 MANUAL THERAPY 1/> REGIONS: CPT | Performed by: PHYSICAL THERAPIST

## 2024-02-15 NOTE — PROGRESS NOTES
"Daily Note     Today's date: 2/15/2024  Patient name: Efrain Sun Jr.  : 1970  MRN: [id number]  Referring provider: Azael Yousif PA*  Dx:   Encounter Diagnosis     ICD-10-CM    1. Acute pain of right shoulder  M25.511       2. Fall due to slipping on ice or snow, subsequent encounter  W00.9XXD       3. Bone lesion-small lucencies measuring about 6 mm in the proximal humeral shaft  M89.9       4. Weakness of shoulder  R29.898                      Subjective: Pt reports using his R arm more for ADLs.     Objective: See treatment diary below  PROM ER 0 deg ABD 64 deg, ABD 90 deg,  deg    Assessment: Pt demonstrated improved PROM once he guarded less.    Plan: Cont. POC.     Precautions: PMHx: HTN, R knee sx  Dx: acute R shoulder pain s/p fall, possible R RTC tear    Manuals 1/29 1/31 2/5 2/15         R shoulder FF PROM 10\"x5 10\"x10 10\"x10 10\"x10         R shoulder ABD PROM 10\"x5 10\"x10 10\"x10 10\"x10         R shoulder ER PROM 10\"x5 10\"x10 10\"x10 10\"x10         R shoulder IR PROM    10\"x10         R elbow PROM 10\"x5            Laser R shoulder                          Neuro Re-Ed                                                                                                        Ther Ex             R UT stretch 10\"x3 10\"x3 10\"x3 10\"x3         R elbow AROM 1x10 1x10 1x10 1x10         R wrist rolls 1x20 1x20 1x20          R finger AROM 1x20 1x20 1x20          Static pendulums 10\"x5 10\"x10 10\"x10          Table slides 1x10 1x10 1x10 3x10         Pulleys scaption  5\"x5 5\"x5          Pulleys FF    5\"x10         Pulleys ABD    5\"x10         Standing FF FROM    1x10         ER AAROM  5\"x5 5\"x5 5\"x10                                   Ther Activity                                         Gait Training                                       Modalities                                          "

## 2024-02-19 ENCOUNTER — OFFICE VISIT (OUTPATIENT)
Dept: PHYSICAL THERAPY | Facility: CLINIC | Age: 54
End: 2024-02-19
Payer: COMMERCIAL

## 2024-02-19 DIAGNOSIS — M89.9 BONE LESION: ICD-10-CM
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
## 2024-02-19 DIAGNOSIS — M25.511 ACUTE PAIN OF RIGHT SHOULDER: Primary | ICD-10-CM

## 2024-02-19 DIAGNOSIS — R29.898 WEAKNESS OF SHOULDER: ICD-10-CM

## 2024-02-19 PROCEDURE — 97110 THERAPEUTIC EXERCISES: CPT

## 2024-02-19 PROCEDURE — 97140 MANUAL THERAPY 1/> REGIONS: CPT

## 2024-02-19 NOTE — PROGRESS NOTES
"Daily Note     Today's date: 2024  Patient name: Efrain Sun Jr.  : 1970  MRN: [id number]  Referring provider: Azael Yousif PA*  Dx:   Encounter Diagnosis     ICD-10-CM    1. Acute pain of right shoulder  M25.511       2. Fall due to slipping on ice or snow, subsequent encounter  W00.9XXD       3. Bone lesion-small lucencies measuring about 6 mm in the proximal humeral shaft  M89.9       4. Weakness of shoulder  R29.898             Start Time: 1200  Stop Time: 1240  Total time in clinic (min): 40 minutes    Subjective: Patient reports, \"4/10\" pain at rest and increased pain with activity and exercise.     Objective: See treatment diary below    Assessment: Patient tends to guard moderately during PROM but had improved motion.     Plan: Cont. POC.     Precautions: PMHx: HTN, R knee sx  Dx: acute R shoulder pain s/p fall, possible R RTC tear    Manuals 1/29 1/31 2/5 2/15 2/19        R shoulder FF PROM 10\"x5 10\"x10 10\"x10 10\"x10 10\"x10        R shoulder ABD PROM 10\"x5 10\"x10 10\"x10 10\"x10 10\"x10        R shoulder ER PROM 10\"x5 10\"x10 10\"x10 10\"x10 10\"x10        R shoulder IR PROM    10\"x10 10\"x10        R elbow PROM 10\"x5            Laser R shoulder                          Neuro Re-Ed                                                                                                        Ther Ex             R UT stretch 10\"x3 10\"x3 10\"x3 10\"x3 10\"x3        R elbow AROM 1x10 1x10 1x10 1x10 1x10        R wrist rolls 1x20 1x20 1x20          R finger AROM 1x20 1x20 1x20          Static pendulums 10\"x5 10\"x10 10\"x10          Table slides 1x10 1x10 1x10 3x10 3x10        Pulleys scaption  5\"x5 5\"x5          Pulleys FF    5\"x10 5\"x10        Pulleys ABD    5\"x10 5\"x10        Standing FF FROM    1x10 1x10        ER AAROM  5\"x5 5\"x5 5\"x10 5\"x10                                  Ther Activity                                         Gait Training                                       Modalities               "

## 2024-02-21 ENCOUNTER — APPOINTMENT (OUTPATIENT)
Dept: PHYSICAL THERAPY | Facility: CLINIC | Age: 54
End: 2024-02-21
Payer: COMMERCIAL

## 2024-02-22 ENCOUNTER — OFFICE VISIT (OUTPATIENT)
Dept: OBGYN CLINIC | Facility: OTHER | Age: 54
End: 2024-02-22
Payer: COMMERCIAL

## 2024-02-22 ENCOUNTER — TELEPHONE (OUTPATIENT)
Dept: OBGYN CLINIC | Facility: OTHER | Age: 54
End: 2024-02-22

## 2024-02-22 VITALS
SYSTOLIC BLOOD PRESSURE: 133 MMHG | BODY MASS INDEX: 29.7 KG/M2 | DIASTOLIC BLOOD PRESSURE: 80 MMHG | WEIGHT: 196 LBS | HEIGHT: 68 IN | HEART RATE: 88 BPM

## 2024-02-22 DIAGNOSIS — M75.01 ADHESIVE CAPSULITIS OF RIGHT SHOULDER: Primary | ICD-10-CM

## 2024-02-22 DIAGNOSIS — M89.9 BONE LESION: ICD-10-CM
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
## 2024-02-22 DIAGNOSIS — R29.898 WEAKNESS OF SHOULDER: ICD-10-CM

## 2024-02-22 DIAGNOSIS — M25.511 ACUTE PAIN OF RIGHT SHOULDER: ICD-10-CM

## 2024-02-22 PROCEDURE — 20610 DRAIN/INJ JOINT/BURSA W/O US: CPT | Performed by: ORTHOPAEDIC SURGERY

## 2024-02-22 PROCEDURE — 99204 OFFICE O/P NEW MOD 45 MIN: CPT | Performed by: ORTHOPAEDIC SURGERY

## 2024-02-22 RX ORDER — BETAMETHASONE SODIUM PHOSPHATE AND BETAMETHASONE ACETATE 3; 3 MG/ML; MG/ML
6 INJECTION, SUSPENSION INTRA-ARTICULAR; INTRALESIONAL; INTRAMUSCULAR; SOFT TISSUE
Status: COMPLETED | OUTPATIENT
Start: 2024-02-22 | End: 2024-02-22

## 2024-02-22 RX ORDER — BUPIVACAINE HYDROCHLORIDE 2.5 MG/ML
2 INJECTION, SOLUTION EPIDURAL; INFILTRATION; INTRACAUDAL
Status: COMPLETED | OUTPATIENT
Start: 2024-02-22 | End: 2024-02-22

## 2024-02-22 RX ADMIN — BETAMETHASONE SODIUM PHOSPHATE AND BETAMETHASONE ACETATE 6 MG: 3; 3 INJECTION, SUSPENSION INTRA-ARTICULAR; INTRALESIONAL; INTRAMUSCULAR; SOFT TISSUE at 09:45

## 2024-02-22 RX ADMIN — BUPIVACAINE HYDROCHLORIDE 2 ML: 2.5 INJECTION, SOLUTION EPIDURAL; INFILTRATION; INTRACAUDAL at 09:45

## 2024-02-22 NOTE — LETTER
February 22, 2024     Patient: Efrain Sun .  YOB: 1970  Date of Visit: 2/22/2024      To Whom it May Concern:    Efrain Sun is under my professional care. Efrain was seen in my office on 2/22/2024. Efrain may return to work with limitations no pushing, pulling, or lifting more than 25 pounds, no lifting the right shoulder above shoulder height .    If you have any questions or concerns, please don't hesitate to call.         Sincerely,          Torsten Garcia MD        CC: No Recipients

## 2024-02-22 NOTE — TELEPHONE ENCOUNTER
Efrain  was here for an appt with  on 2-21.  He gave me a claim number but this accident did not happen at work. He does have a . I did try to call [employer] but there work ques were all full. I gave him buffy phone number to call with more information.   well-groomed/no distress

## 2024-02-22 NOTE — PROGRESS NOTES
"I personally examined the patient and reviewed the history provided.  I agree with the note and the assessment and plan by Dr. Genevieve Lopez MD.     Assessment:      Post traumatic adhesive capsulitis right shoulder    Plan:    I did review the patient that fortunately MRI does not show any structural abnormality that occurred from the fall and the lytic lesions seen on plain x-ray are not appreciated on MRI which tells us they are not clinically significant.  He does unfortunately have a clinical exam consistent with posttraumatic base of capsulitis which is a residual of his recent injury (slip and fall).  I do feel the patient will benefit from continued stretching with physical therapy and home stretching exercises as well as the addition of an intra-articular corticosteroid injection was provided today under my direct supervision as detailed below.  No surgical intervention is indicated at this time and we can reevaluate the patient in 3 to 4 months to check his progress.  He does understand that the process of posttraumatic nasal capsulitis may take up to a year or 2 to fully improve, hopefully by initiating treatment and providing the appropriate diagnosis he can improve more rapidly than that.    Large joint arthrocentesis: R glenohumeral  Universal Protocol:  Consent given by: patient  Time out: Immediately prior to procedure a \"time out\" was called to verify the correct patient, procedure, equipment, support staff and site/side marked as required.  Supporting Documentation  Indications: pain   Procedure Details  Location: shoulder - R glenohumeral  Preparation: Patient was prepped and draped in the usual sterile fashion  Needle size: 22 G  Ultrasound guidance: no  Approach: posterior  Medications administered: 6 mg betamethasone acetate-betamethasone sodium phosphate 6 (3-3) mg/mL; 2 mL bupivacaine (PF) 0.25 %    Patient tolerance: patient tolerated the procedure well with no immediate " complications  Dressing:  Sterile dressing applied            Assessment  Diagnoses and all orders for this visit:    Adhesive capsulitis of right shoulder      Fall due to slipping on ice or snow, subsequent encounter      Bone lesion-small lucencies measuring about 6 mm in the proximal humeral shaft - not clinically significant           Discussion and Plan:    Discussed with patient that he has a clinical examination consistent with adhesive capsulitis of the right shoulder. Explained the natural course of this pathology can take 1-2 years to fully resolve and that the mainstay of treatment is gentle stretching exercises with PT, no strengthening. A corticosteroid injection was offered, accepted, and received in the clinic today. Post injection instructions provided. He should return in 3 months for repeat evaluation. Work note provided.    Subjective:   Patient ID: Efrain Sun Jr. is a 53 y.o. male      HPI  The patient presents with a chief complaint of right shoulder pain.   The pain began 1 month(s) ago and is associated with an acute injury.  He was fell on ice directly onto right shoulder 1/20/24. Was originally seen in the ED 1/22/24 for increasing pain without remarkable XR, seen in outpatient follow up with Azael Yousif 1/2/5/24, at which time MRI of the right shoulder was ordered given concern for acute rotator cuff injury. He has also begun PT, which has not improved his pain or ROM. The patient describes the pain as sharp in intensity,  intermittent, occurring with increasing frequency, awakening patient from sleep in timing, and localizes the pain to the  right glenohumeral joint.  The pain is worse with overhead work and overuse and relieved by rest, avoiding the painful activities.  The pain is not associated with numbness and tingling.  The pain is not associated with constitutional symptoms. The patient is not awoken at night by the pain.          The following portions of the patient's  "history were reviewed and updated as appropriate: allergies, current medications, past family history, past medical history, past social history, past surgical history and problem list.    Review of Systems  Negative unless otherwise stated above.    Objective:  /80 (BP Location: Left arm, Patient Position: Sitting, Cuff Size: Standard)   Pulse 88   Ht 5' 8\" (1.727 m)   Wt 88.9 kg (196 lb)   BMI 29.80 kg/m²       Right Shoulder Exam     Range of Motion   Active abduction:  50   External rotation:  40   Forward flexion:  90   Internal rotation 0 degrees:  Sacrum     Muscle Strength   Abduction: 4/5   Internal rotation: 5/5   External rotation: 4/5   Supraspinatus: 4/5     Other   Erythema: absent  Sensation: normal  Pulse: present            Physical Exam  Vitals reviewed.   Constitutional:       General: He is not in acute distress.  HENT:      Head: Normocephalic and atraumatic.   Eyes:      Extraocular Movements: Extraocular movements intact.      Conjunctiva/sclera: Conjunctivae normal.      Pupils: Pupils are equal, round, and reactive to light.   Cardiovascular:      Rate and Rhythm: Normal rate.      Pulses: Normal pulses.   Pulmonary:      Effort: Pulmonary effort is normal.   Abdominal:      General: Abdomen is flat.   Musculoskeletal:      Cervical back: Normal range of motion.   Skin:     General: Skin is warm and dry.      Capillary Refill: Capillary refill takes less than 2 seconds.   Neurological:      Mental Status: He is alert and oriented to person, place, and time.   Psychiatric:         Mood and Affect: Mood normal.           I have personally reviewed pertinent films in PACS and my interpretation is as follows.    XR R shoulder 1/22/24 demonstrates no acute fracture or dislocation. Noted scattered lucencies within proximal humerus.  MRI R shoulder demonstrates no changes in bone in location of lucencies visualized on XR. No rotator cuff tear. Thickening of middle glenohumeral " ligament.

## 2024-02-22 NOTE — PATIENT INSTRUCTIONS
CORTICOSTEROID INJECTION  What is a corticosteroid?  Injuries or disease such as arthritis, bursitis or tendonitis result in inflammation.  In turn, this inflammation can cause swelling and pain.  A local injection of a corticosteroid is provided to diminish inflammation.  By doing so, it will also decrease pain and swelling which is making you uncomfortable.     Is this the same thing as a Cortisone Injection?  Cortisone® is a brand name of a corticosteroid used commonly in the past.  Today I commonly use a more water-soluble corticosteroid named Celestone®.    Will the injection hurt?  As with any injection, you may feel pain at the time of the injection. Typically, I use a local anesthetic (Marcaine®) in addition to the corticosteroid to determine if the injection has been placed in the appropriate location.  Hence it is important to monitor your symptoms 4-6 hours after the injection, as the area will be anesthetized (numb) while the local anesthetic is working.  Once the local anesthetic wears off, the intensity of pain can be the same as it was prior to the injection, or even worse.  This does not mean that the injection is not working.  The corticosteroid may take 24-72 hours to begin having a positive effect.    If you do experience an increase in pain, the use of an ice pack on the area for 20 minutes at a time should help.  It is also helpful to take an oral anti-inflammatory such as Tylenol® or Motrin® if you are able to medically do so.  For this reason it is best to avoid activities that put stress on the area the first 24 hours after the injection.    How long will pain relief last?  This will vary according to the type and severity of the symptoms being treated and the severity of the condition.  Symptom relief may last weeks to months.  I typically couple injections with physical therapy so that the underlying problem causing the inflammation may be treated as the pain diminishes.  If the combination  is not successful, you may be a surgical candidate.    I have read bad things about steroids.  Will these things happen to me?  Corticosteroids, when utilized properly, are safe and effective drugs.  When used in a low dose, potential adverse reactions are very rare.  Some patients may experience a sensation of flushing for several days.  Very rarely, there can be a local reaction which may include increased discomfort for a period of time in the areas that has been injected.  A steroid should not be used over and over again.  Multiple injections in the same area can produce adverse effects such as tissue atrophy and degeneration of tendon or cartilage.  A small percentage of patients (< 0.1%) may develop an infection in the joint after injection.  This is a treatable problem, but if neglected, may result in permanent disability.  Signs of infection include redness, swelling, discharge, fevers, increasing pain and drainage from the injection site.  This represents an emergency and you should contact our office immediately or seek treatment in the ER if after hours.    If I have diabetes, will this injection affect me?  If you are diabetic, an injection of a corticosteroid can raise your blood sugar level, requiring more insulin for a brief period of time.  This may necessitate careful blood sugar maintenance.  If the elevated sugars are not able to be controlled, contact your diabetic doctor for guidance.

## 2024-02-26 ENCOUNTER — OFFICE VISIT (OUTPATIENT)
Dept: PHYSICAL THERAPY | Facility: CLINIC | Age: 54
End: 2024-02-26
Payer: COMMERCIAL

## 2024-02-26 DIAGNOSIS — R29.898 WEAKNESS OF SHOULDER: ICD-10-CM

## 2024-02-26 DIAGNOSIS — M25.511 ACUTE PAIN OF RIGHT SHOULDER: Primary | ICD-10-CM

## 2024-02-26 DIAGNOSIS — M89.9 BONE LESION: ICD-10-CM
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
## 2024-02-26 PROCEDURE — 97110 THERAPEUTIC EXERCISES: CPT

## 2024-02-26 PROCEDURE — 97140 MANUAL THERAPY 1/> REGIONS: CPT

## 2024-02-26 NOTE — PROGRESS NOTES
"Daily Note     Today's date: 2024  Patient name: Efrain Sun Jr.  : 1970  MRN: [id number]  Referring provider: Azael Yousif PA*  Dx:   Encounter Diagnosis     ICD-10-CM    1. Acute pain of right shoulder  M25.511       2. Fall due to slipping on ice or snow, subsequent encounter  W00.9XXD       3. Bone lesion-small lucencies measuring about 6 mm in the proximal humeral shaft  M89.9       4. Weakness of shoulder  R29.898             Start Time: 1800  Stop Time: 1830  Total time in clinic (min): 30 minutes    Subjective: Patient states, \"I have a frozen shoulder. The doctor just wants me to complete ROM exercises. I'm glad I don't have torn muscles but the pain is still really bad\". He notes \"8/10\" soreness pain prior to therapy.     Patient was on call for work and had to leave therapy a little earlier than planned. However, we were able to complete the most important exercises and manual stretching.     Objective: See treatment diary below    Assessment: Patient still has mod guarding secondary to pain and soreness. PROM does improve following manual stretches. Still has very limited AAROM demonstrated on the pulleys compared to passive stretching.     Plan: Cont. POC.     Precautions: PMHx: HTN, R knee sx  Dx: acute R shoulder pain s/p fall, possible R RTC tear    Manuals 1/29 1/31 2/5 2/15 2/19 2/26       R shoulder FF PROM 10\"x5 10\"x10 10\"x10 10\"x10 10\"x10 10\"x10       R shoulder ABD PROM 10\"x5 10\"x10 10\"x10 10\"x10 10\"x10 10\"x10       R shoulder ER PROM 10\"x5 10\"x10 10\"x10 10\"x10 10\"x10 10\"x10       R shoulder IR PROM    10\"x10 10\"x10 10\"x10       R elbow PROM 10\"x5            Laser R shoulder                          Neuro Re-Ed                                                                                                        Ther Ex             R UT stretch 10\"x3 10\"x3 10\"x3 10\"x3 10\"x3 10\"x3       R elbow AROM 1x10 1x10 1x10 1x10 1x10        R wrist rolls 1x20 1x20 1x20          R " "finger AROM 1x20 1x20 1x20          Static pendulums 10\"x5 10\"x10 10\"x10          Table slides 1x10 1x10 1x10 3x10 3x10        Pulleys scaption  5\"x5 5\"x5          Pulleys FF    5\"x10 5\"x10 5\"x10       Pulleys ABD    5\"x10 5\"x10 5\"x10       Standing FF FROM    1x10 1x10        ER AAROM  5\"x5 5\"x5 5\"x10 5\"x10 5\"x10                                 Ther Activity                                         Gait Training                                       Modalities                                          "

## 2024-02-29 ENCOUNTER — APPOINTMENT (OUTPATIENT)
Dept: PHYSICAL THERAPY | Facility: CLINIC | Age: 54
End: 2024-02-29
Payer: COMMERCIAL

## 2024-03-04 ENCOUNTER — APPOINTMENT (OUTPATIENT)
Dept: PHYSICAL THERAPY | Facility: CLINIC | Age: 54
End: 2024-03-04
Payer: COMMERCIAL

## 2024-03-11 ENCOUNTER — APPOINTMENT (OUTPATIENT)
Dept: PHYSICAL THERAPY | Facility: CLINIC | Age: 54
End: 2024-03-11
Payer: COMMERCIAL

## 2024-03-14 ENCOUNTER — OFFICE VISIT (OUTPATIENT)
Dept: PHYSICAL THERAPY | Facility: CLINIC | Age: 54
End: 2024-03-14
Payer: COMMERCIAL

## 2024-03-14 DIAGNOSIS — M25.511 ACUTE PAIN OF RIGHT SHOULDER: Primary | ICD-10-CM
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
## 2024-03-14 DIAGNOSIS — M89.9 BONE LESION: ICD-10-CM

## 2024-03-14 DIAGNOSIS — R29.898 WEAKNESS OF SHOULDER: ICD-10-CM

## 2024-03-14 PROCEDURE — 97140 MANUAL THERAPY 1/> REGIONS: CPT | Performed by: PHYSICAL THERAPIST

## 2024-03-14 PROCEDURE — 97112 NEUROMUSCULAR REEDUCATION: CPT | Performed by: PHYSICAL THERAPIST

## 2024-03-14 PROCEDURE — 97110 THERAPEUTIC EXERCISES: CPT | Performed by: PHYSICAL THERAPIST

## 2024-03-14 NOTE — PROGRESS NOTES
"Daily Note     Today's date: 3/14/2024  Patient name: Efrain Sun Jr.  : 1970  MRN: [id number]  Referring provider: Azael Yousif PA*  Dx:   Encounter Diagnosis     ICD-10-CM    1. Acute pain of right shoulder  M25.511       2. Fall due to slipping on ice or snow, subsequent encounter  W00.9XXD       3. Bone lesion-small lucencies measuring about 6 mm in the proximal humeral shaft  M89.9       4. Weakness of shoulder  R29.898                        Subjective: Pt returns to PT after a 2.5 wks absence. He reports constant 10/10 R shoulder and limited ROM since return to work.    Patient was on call for work and had to leave therapy a little earlier than planned. However, we were able to complete the most important exercises and manual stretching.     Objective: See treatment diary below  Rep Sh EXT: I,B (ROM)  Rep RET: D,B (pain, ROM)  Rep RET pt OP: D,B (pain, ROM)    Pre-tx AROM FF 70 deg, post Rep RET pt  deg    PROM ER 50 deg, IR 70 deg,  deg,  deg    Assessment: Pt demonstrated a cervical retraction DP indicating relevant cervical component to his R shoulder pain and ROM limitations. Pt demonstrated severe guarding and empty end-feel in all planes of R shoulder PROM.    Plan: Cont. POC.     Precautions: PMHx: HTN, R knee sx  Dx: acute R shoulder pain s/p fall    Manuals 1/29 1/31 2/5 2/15 2/19 2/26 3/14      R shoulder FF PROM 10\"x5 10\"x10 10\"x10 10\"x10 10\"x10 10\"x10 10\"x10      R shoulder ABD PROM 10\"x5 10\"x10 10\"x10 10\"x10 10\"x10 10\"x10 10\"x10      R shoulder ER PROM 10\"x5 10\"x10 10\"x10 10\"x10 10\"x10 10\"x10 10\"x10      R shoulder IR PROM    10\"x10 10\"x10 10\"x10 10\"x10      R elbow PROM 10\"x5            Seated Rep RET clin OP       1x6  1x10                   Neuro Re-Ed                                                                                                        Ther Ex             R UT stretch 10\"x3 10\"x3 10\"x3 10\"x3 10\"x3 10\"x3 D/c      Postural correction and " "awareness training       5 min      Seated Rep RET       3x10      Seated Rep RET pt OP       3x10                   R elbow AROM 1x10 1x10 1x10 1x10 1x10        R wrist rolls 1x20 1x20 1x20          R finger AROM 1x20 1x20 1x20          Static pendulums 10\"x5 10\"x10 10\"x10          Table slides 1x10 1x10 1x10 3x10 3x10        Pulleys scaption  5\"x5 5\"x5          Pulleys FF    5\"x10 5\"x10 5\"x10 5\"x10      Pulleys ABD    5\"x10 5\"x10 5\"x10 5\"x10      Standing FF FROM    1x10 1x10        ER AAROM  5\"x5 5\"x5 5\"x10 5\"x10 5\"x10                                 Ther Activity                                         Gait Training                                       Modalities                                          "

## 2024-03-18 ENCOUNTER — TELEPHONE (OUTPATIENT)
Age: 54
End: 2024-03-18

## 2024-03-18 NOTE — TELEPHONE ENCOUNTER
Caller: Antoinette - Patient Spouse    Doctor: Radha    Reason for call: Calling to request original copies of [place] paperwork.  They did receive a digital copy via [employer] but the digital copy appears warped, and HR is requesting originals.    Please call back to clarify if these can be sent, or if original copies are available for patient or spouse to  in office.    Call back#: [phone]  Patient phone#: [phone] At work - available 1:00-2:00 during lunch

## 2024-03-21 ENCOUNTER — TELEPHONE (OUTPATIENT)
Dept: FAMILY MEDICINE CLINIC | Facility: OTHER | Age: 54
End: 2024-03-21

## 2024-03-21 ENCOUNTER — OFFICE VISIT (OUTPATIENT)
Dept: PHYSICAL THERAPY | Facility: CLINIC | Age: 54
End: 2024-03-21
Payer: COMMERCIAL

## 2024-03-21 DIAGNOSIS — M25.511 ACUTE PAIN OF RIGHT SHOULDER: Primary | ICD-10-CM

## 2024-03-21 DIAGNOSIS — R29.898 WEAKNESS OF SHOULDER: ICD-10-CM
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
## 2024-03-21 DIAGNOSIS — M89.9 BONE LESION: ICD-10-CM

## 2024-03-21 PROCEDURE — 97140 MANUAL THERAPY 1/> REGIONS: CPT | Performed by: PHYSICAL THERAPIST

## 2024-03-21 PROCEDURE — 97110 THERAPEUTIC EXERCISES: CPT | Performed by: PHYSICAL THERAPIST

## 2024-03-21 NOTE — PROGRESS NOTES
"Daily Note     Today's date: 3/21/2024  Patient name: Efrain Sun Jr.  : 1970  MRN: [id number]  Referring provider: Azael Yousif PA*  Dx:   Encounter Diagnosis     ICD-10-CM    1. Acute pain of right shoulder  M25.511       2. Fall due to slipping on ice or snow, subsequent encounter  W00.9XXD       3. Weakness of shoulder  R29.898       4. Bone lesion-small lucencies measuring about 6 mm in the proximal humeral shaft  M89.9                        Subjective: Pt reports constant 10+/10 R shoulder pain and decreased ROM since falling on his R shoulder at work 1 wk when he was attacked by a dog in a tenant's apartment.    Objective: See treatment diary below  Rep Sh EXT: P,NW  Rep RET: P,NW    PROM ER at side 40, IR at side 70, ,     Assessment: Pt demonstrated no cervical or R shoulder DP secondary to pain and severe guarding with empty end-feel in all planes.    Plan: Cont. POC.     Precautions: PMHx: HTN, R knee sx  Dx: acute R shoulder pain s/p fall    Manuals 1/29 1/31 2/5 2/15 2/19 2/26 3/14 3/21     R shoulder FF PROM 10\"x5 10\"x10 10\"x10 10\"x10 10\"x10 10\"x10 10\"x10 30\"x12     R shoulder ABD PROM 10\"x5 10\"x10 10\"x10 10\"x10 10\"x10 10\"x10 10\"x10 30\"x12     R shoulder ER PROM 10\"x5 10\"x10 10\"x10 10\"x10 10\"x10 10\"x10 10\"x10 30\"x12     R shoulder IR PROM    10\"x10 10\"x10 10\"x10 10\"x10 30\"x12     R elbow PROM 10\"x5            Seated Rep RET clin OP       1x6  1x10                   Neuro Re-Ed                                                                                                        Ther Ex             R UT stretch 10\"x3 10\"x3 10\"x3 10\"x3 10\"x3 10\"x3 D/c      Postural correction and awareness training       5 min      Seated Rep RET       3x10 1x10     Seated Rep RET pt OP       3x10      Seated Rep R shoulder EXT        1x10     R elbow AROM 1x10 1x10 1x10 1x10 1x10        R wrist rolls 1x20 1x20 1x20          R finger AROM 1x20 1x20 1x20          Static pendulums 10\"x5 " "10\"x10 10\"x10          Table slides 1x10 1x10 1x10 3x10 3x10        Pulleys scaption  5\"x5 5\"x5          Pulleys FF    5\"x10 5\"x10 5\"x10 5\"x10 5\"x15     Pulleys ABD    5\"x10 5\"x10 5\"x10 5\"x10 5\"x15     Standing FF FROM    1x10 1x10   1x5     Standing ABD FROM        1x5     Standing ER FROM        1x5     Standing IR FROM        1x5     ER AAROM  5\"x5 5\"x5 5\"x10 5\"x10 5\"x10  5\"x15                               Ther Activity                                         Gait Training                                       Modalities                                          "

## 2024-04-08 ENCOUNTER — OFFICE VISIT (OUTPATIENT)
Dept: OBGYN CLINIC | Facility: OTHER | Age: 54
End: 2024-04-08

## 2024-04-08 VITALS
DIASTOLIC BLOOD PRESSURE: 85 MMHG | BODY MASS INDEX: 30.01 KG/M2 | HEIGHT: 68 IN | WEIGHT: 198 LBS | HEART RATE: 83 BPM | SYSTOLIC BLOOD PRESSURE: 148 MMHG

## 2024-04-08 DIAGNOSIS — M75.01 ADHESIVE CAPSULITIS OF RIGHT SHOULDER: Primary | ICD-10-CM

## 2024-04-08 DIAGNOSIS — S46.001D INJURY OF RIGHT ROTATOR CUFF, SUBSEQUENT ENCOUNTER: ICD-10-CM

## 2024-04-08 PROCEDURE — 99213 OFFICE O/P EST LOW 20 MIN: CPT | Performed by: ORTHOPAEDIC SURGERY

## 2024-04-08 NOTE — PROGRESS NOTES
"  Assessment  Diagnoses and all orders for this visit:    Post traumatic Adhesive capsulitis of right shoulder    Injury of right rotator cuff, subsequent encounter    Discussion and Plan:    Explained to the patient that his clinical examination continues to be consistent with adhesive capsulitis of the right shoulder due to his noted acute injury. He was instructed to continue to perform the home stretching program as taught to him by formal physical therapy. He does not need to perform formal physical therapy at this time. He understood and all questions were answered.   We could consider a repeat cortisone injection in a few months if his symptoms persist.   Follow up in 2 months for re evaluation of symptoms.     Subjective:   Patient ID: Efrain Sun Jr. is a 53 y.o. male presents today for a follow up visit of right shoulder pain.   The pain began 2 month(s) ago and is associated with an acute injury.  He was fell directly onto right shoulder 1/20/24. Today, patient reports that he believes the PT was stretching home to \"aggressively\" and has caused increase in pain. The patient describes the pain as sharp in intensity,  intermittent, occurring with increasing frequency, awakening patient from sleep in timing, and localizes the pain to the  right glenohumeral joint.  The pain is worse with overhead work and overuse and relieved by rest, avoiding the painful activities.  The pain is not associated with numbness and tingling. The patient is not awoken at night by the pain.          The following portions of the patient's history were reviewed and updated as appropriate: allergies, current medications, past family history, past medical history, past social history, past surgical history and problem list.    Objective:  /85 (BP Location: Right arm, Patient Position: Sitting, Cuff Size: Large)   Pulse 83   Ht 5' 8\" (1.727 m)   Wt 89.8 kg (198 lb)   BMI 30.11 kg/m²       Right Shoulder Exam     Range " of Motion   External rotation:  10   Right shoulder forward flexion: AROM: 80 with pain. PROM: 100 limited by guarding and pain.     Other   Erythema: absent  Sensation: normal  Pulse: present            Physical Exam  Constitutional:       General: He is not in acute distress.     Appearance: He is well-developed.   Eyes:      Conjunctiva/sclera: Conjunctivae normal.      Pupils: Pupils are equal, round, and reactive to light.   Cardiovascular:      Rate and Rhythm: Normal rate and regular rhythm.   Pulmonary:      Effort: Pulmonary effort is normal.      Breath sounds: Normal breath sounds.   Abdominal:      General: Bowel sounds are normal.      Palpations: Abdomen is soft.   Musculoskeletal:      Cervical back: Normal range of motion and neck supple.   Skin:     General: Skin is warm and dry.      Findings: No erythema or rash.   Neurological:      Mental Status: He is alert and oriented to person, place, and time.      Deep Tendon Reflexes: Reflexes are normal and symmetric.   Psychiatric:         Behavior: Behavior normal.       Scribe Attestation      I,:  Ludin Zavala am acting as a scribe while in the presence of the attending physician.:       I,:  Torsten Garcia MD personally performed the services described in this documentation    as scribed in my presence.:

## 2024-05-06 ENCOUNTER — APPOINTMENT (EMERGENCY)
Dept: RADIOLOGY | Facility: HOSPITAL | Age: 54
End: 2024-05-06
Payer: OTHER MISCELLANEOUS

## 2024-05-06 ENCOUNTER — HOSPITAL ENCOUNTER (EMERGENCY)
Facility: HOSPITAL | Age: 54
Discharge: HOME | End: 2024-05-06
Attending: STUDENT IN AN ORGANIZED HEALTH CARE EDUCATION/TRAINING PROGRAM | Admitting: STUDENT IN AN ORGANIZED HEALTH CARE EDUCATION/TRAINING PROGRAM
Payer: OTHER MISCELLANEOUS

## 2024-05-06 VITALS
DIASTOLIC BLOOD PRESSURE: 87 MMHG | BODY MASS INDEX: 28.56 KG/M2 | HEART RATE: 68 BPM | WEIGHT: 187.83 LBS | RESPIRATION RATE: 20 BRPM | OXYGEN SATURATION: 95 % | SYSTOLIC BLOOD PRESSURE: 145 MMHG | TEMPERATURE: 97.6 F

## 2024-05-06 DIAGNOSIS — S89.91XA INJURY OF RIGHT KNEE, INITIAL ENCOUNTER: Primary | ICD-10-CM

## 2024-05-06 PROCEDURE — 73564 X-RAY EXAM KNEE 4 OR MORE: CPT

## 2024-05-06 PROCEDURE — 99283 EMERGENCY DEPT VISIT LOW MDM: CPT

## 2024-05-06 PROCEDURE — 99284 EMERGENCY DEPT VISIT MOD MDM: CPT

## 2024-05-06 NOTE — ED PROVIDER NOTES
"History  Chief Complaint   Patient presents with    Knee Injury     Arrives with c/o right knee pain that started three weeks ago after twisting it at work.      Efrain is a 53 year old male with a PMHx left knee surgery presenting to the ED for right knee pain x 3 weeks. Notes he was at work when he turned quickly on a planted foot. The pain instantly started, initially thought it was a muscular injury, but because pain has persisted and gotten worse - he is concerned for ACL injury. The pain is to the posterior popliteal fossa, wraps around to the lateral side, and to the front of the knee. He has been doing all of the \"RICE\" protocol without much relief. Purchased a knee brace which seemed to have worsened the pain so he took it off and believes the swelling was then worse. Is having difficultly with full ROM, particularly flexion. Has been continuing to work and weight bear but notes pain when doing so.      Prior to Admission Medications   Prescriptions Last Dose Informant Patient Reported? Taking?   erythromycin (ILOTYCIN) ophthalmic ointment   No No   Sig: Place a 1/2 inch ribbon of ointment into the lower eyelid 4 times a day for 5 days.   lisinopril (ZESTRIL) 10 mg tablet  Self No No   Sig: Take 1 tablet (10 mg total) by mouth daily   Patient taking differently: Take 5 mg by mouth daily Take 1/2 tablet daily   naproxen (Naprosyn) 500 mg tablet   No No   Sig: Take 1 tablet (500 mg total) by mouth 2 (two) times a day with meals for 7 days      Facility-Administered Medications: None       History reviewed. No pertinent past medical history.    Past Surgical History:   Procedure Laterality Date    APPENDECTOMY      5 yrs old    KNEE SURGERY      TONSILLECTOMY      13 yrs old       Family History   Problem Relation Age of Onset    Dementia Mother         passed away     I have reviewed and agree with the history as documented.    E-Cigarette/Vaping    E-Cigarette Use Never User      E-Cigarette/Vaping Substances "     Social History     Tobacco Use    Smoking status: Every Day     Current packs/day: 0.50     Types: Cigarettes    Smokeless tobacco: Never   Vaping Use    Vaping status: Never Used   Substance Use Topics    Alcohol use: Not Currently    Drug use: Never       Review of Systems   Constitutional:  Negative for chills and fever.   Eyes:  Negative for photophobia and visual disturbance.   Respiratory:  Negative for cough and shortness of breath.    Cardiovascular:  Negative for chest pain and palpitations.   Gastrointestinal:  Negative for abdominal pain, diarrhea, nausea and vomiting.   Musculoskeletal:  Positive for arthralgias, gait problem and joint swelling.   Skin:  Positive for color change. Negative for rash and wound.   Neurological:  Negative for numbness.       Physical Exam  Physical Exam  Vitals reviewed.   Constitutional:       General: He is not in acute distress.     Appearance: Normal appearance. He is not ill-appearing, toxic-appearing or diaphoretic.   HENT:      Head: Normocephalic and atraumatic.      Right Ear: External ear normal.      Left Ear: External ear normal.      Nose: Nose normal.   Eyes:      General: No scleral icterus.        Right eye: No discharge.         Left eye: No discharge.      Extraocular Movements: Extraocular movements intact.      Conjunctiva/sclera: Conjunctivae normal.   Cardiovascular:      Rate and Rhythm: Normal rate.      Pulses: Normal pulses.           Dorsalis pedis pulses are 2+ on the right side and 2+ on the left side.        Posterior tibial pulses are 2+ on the right side and 2+ on the left side.   Pulmonary:      Effort: Pulmonary effort is normal. No respiratory distress.   Musculoskeletal:      Right hip: Normal.      Left hip: Normal.      Right knee: Swelling (minor) and ecchymosis (small, superior portion of the knee) present. No crepitus. Decreased range of motion (to flexion due to pain). Tenderness (and to popliteal fossa) present over the lateral  joint line and ACL. No LCL laxity, MCL laxity, ACL laxity or PCL laxity. Normal alignment. Normal pulse.      Instability Tests: Anterior drawer test negative. Posterior drawer test negative. Anterior Lachman test negative.      Left knee: Normal.      Right lower leg: No edema.      Left lower leg: No edema.      Right ankle: Normal.      Left ankle: Normal.   Skin:     General: Skin is warm and dry.      Capillary Refill: Capillary refill takes less than 2 seconds.   Neurological:      General: No focal deficit present.      Mental Status: He is alert.      Sensory: Sensation is intact.      Motor: Motor function is intact. No weakness.   Psychiatric:         Mood and Affect: Mood normal.         Behavior: Behavior normal.         Vital Signs  ED Triage Vitals   Temperature Pulse Respirations Blood Pressure SpO2   05/06/24 1004 05/06/24 1006 05/06/24 1004 05/06/24 1006 05/06/24 1006   97.6 °F (36.4 °C) 68 20 145/87 95 %      Temp Source Heart Rate Source Patient Position - Orthostatic VS BP Location FiO2 (%)   05/06/24 1004 05/06/24 1004 05/06/24 1004 05/06/24 1004 --   Oral Monitor Sitting Right arm       Pain Score       05/06/24 1004       10 - Worst Possible Pain           Vitals:    05/06/24 1004 05/06/24 1006   BP:  145/87   Pulse:  68   Patient Position - Orthostatic VS: Sitting          Visual Acuity      ED Medications  Medications - No data to display    Diagnostic Studies  Results Reviewed       None                   XR knee 4+ views RIGHT   Final Result by Shai Lockwood MD (05/06 1420)      Ossific fragment adjacent to the medial tibial plateau could represent a chronic fracture.                  Resident: DESTINY THOMAS I, the attending radiologist, have reviewed the images and agree with the final report above.      Workstation performed: [id number]                    Procedures  Procedures         ED Course                               SBIRT 20yo+      Flowsheet Row Most Recent Value    Initial Alcohol Screen: US AUDIT-C     1. How often do you have a drink containing alcohol? 0 Filed at: 05/06/2024 1006   2. How many drinks containing alcohol do you have on a typical day you are drinking?  0 Filed at: 05/06/2024 1006   3a. Male UNDER 65: How often do you have five or more drinks on one occasion? 0 Filed at: 05/06/2024 1006   3b. FEMALE Any Age, or MALE 65+: How often do you have 4 or more drinks on one occassion? 0 Filed at: 05/06/2024 1006   Audit-C Score 0 Filed at: 05/06/2024 1006   [place]: How many times in the past year have you...    Used an illegal drug or used a prescription medication for non-medical reasons? Never Filed at: 05/06/2024 1006                      Medical Decision Making  Neurovascularly intact.    Differential diagnosis to include but not limited to: fracture, ligamentous injury, tendinous injury, meniscal injury, sprain, muscle strain    Plan: X rays already ordered per first nurse protocol.    Results: Per my interpretation, chronic osseous fragment but no acute fracture.    Discharge Plan: Will provide patient with crutches, orthopedic phone number provided, orthopedic referral placed. Discussed supportive care, pain control. Pt stable at time of discharge, vital signs reviewed, questions answered. Strict ER return precautions provided/discussed and were well understood by patient. Patient's vitals, labs and/or imaging results, diagnosis, and treatment plan were discussed with the patient. All new and/or changed medications were discussed - specifically to include route of administration, how often to take, when to take, and the pharmacy they were sent to. Strict return precautions as well as close follow up with PCP was discussed with the patient and the patient was agreeable to my recommendations.  Patient verbally acknowledged understanding. All labs, imaging were reviewed and used in the medical decision making process (if ordered).     Portions of this chart may  "have been written with voice recognition software.  Occasional grammatical errors, wrong word or \"sound a like\" substitutions may have occurred due to software limitations.  Please read carefully and use context to recognize where substitutions have occurred.    Problems Addressed:  Injury of right knee, initial encounter: acute illness or injury    Amount and/or Complexity of Data Reviewed  Radiology: ordered and independent interpretation performed.     Details: Per my interpretation, chronic osseous fracture/chip fragment. No acute osseous abnormality.              Disposition  Final diagnoses:   Injury of right knee, initial encounter     Time reflects when diagnosis was documented in both MDM as applicable and the Disposition within this note       Time User Action Codes Description Comment    5/6/2024 11:37 AM Brissa Cline Add [S89.91XA] Injury of right knee, initial encounter           ED Disposition       ED Disposition   Discharge    Condition   Stable    Date/Time   Mon May 6, 2024 1144    Comment   Efrain Sun Jr. discharge to home/self care.                   Follow-up Information       Follow up With Specialties Details Why Contact Info Additional Information    Jakub Cunningham MD Family Medicine Schedule an appointment as soon as possible for a visit  Follow up, As needed [address] [address]  [phone]       [facility] Emergency Department Emergency Medicine Go to  If symptoms worsen [address] [facility] [id number] [phone] [facility] Emergency Department, [address], [place], [address]    [facility] [place] [facility] Schedule an appointment as soon as possible for a visit  Follow up [address]  [address]  [facility] [id number] [phone] [facility] [place], [address], [address], [place], [place], " [address]   [phone]            Discharge Medication List as of 5/6/2024 11:45 AM        CONTINUE these medications which have NOT CHANGED    Details   erythromycin (ILOTYCIN) ophthalmic ointment Place a 1/2 inch ribbon of ointment into the lower eyelid 4 times a day for 5 days., Normal      lisinopril (ZESTRIL) 10 mg tablet Take 1 tablet (10 mg total) by mouth daily, Starting Fri 1/15/2021, Normal      naproxen (Naprosyn) 500 mg tablet Take 1 tablet (500 mg total) by mouth 2 (two) times a day with meals for 7 days, Starting Mon 1/22/2024, Until Mon 1/29/2024, Normal                 PDMP Review       None            ED Provider  Electronically Signed by             Brissa Cline PA-C  05/06/24 [id number]

## 2024-05-06 NOTE — Clinical Note
Efrain Sun was seen and treated in our emergency department on 5/6/2024.        No work until cleared by Family Doctor/Orthopedics        Diagnosis:     Efrain  .    He may return on this date:          If you have any questions or concerns, please don't hesitate to call.      Brissa Cline PA-C    ______________________________           _______________          _______________  Hospital Representative                              Date                                Time

## 2024-05-06 NOTE — DISCHARGE INSTRUCTIONS
Rotate Tylenol and Motrin every 3 hours for best relief. For example, take Motrin then in 3 hours take Tylenol then 3 hours Motrin, repeat. Maximum Tylenol daily: 4,000 mg. Maximum ibuprofen daily: 3,600 mg.  Rest, elevate the leg for the next few days. Heat compresses 15 minutes on 15 minutes off and repeat. Wear the brace as needed for relief.  Follow up with orthopedics.

## 2024-05-09 ENCOUNTER — OFFICE VISIT (OUTPATIENT)
Dept: OBGYN CLINIC | Facility: MEDICAL CENTER | Age: 54
End: 2024-05-09
Payer: OTHER MISCELLANEOUS

## 2024-05-09 VITALS
HEIGHT: 68 IN | HEART RATE: 68 BPM | DIASTOLIC BLOOD PRESSURE: 82 MMHG | WEIGHT: 187.83 LBS | BODY MASS INDEX: 28.47 KG/M2 | SYSTOLIC BLOOD PRESSURE: 178 MMHG

## 2024-05-09 DIAGNOSIS — S89.91XA INJURY OF RIGHT KNEE, INITIAL ENCOUNTER: ICD-10-CM

## 2024-05-09 DIAGNOSIS — M23.91 INTERNAL DERANGEMENT OF RIGHT KNEE: Primary | ICD-10-CM

## 2024-05-09 PROCEDURE — 99214 OFFICE O/P EST MOD 30 MIN: CPT | Performed by: STUDENT IN AN ORGANIZED HEALTH CARE EDUCATION/TRAINING PROGRAM

## 2024-05-09 NOTE — PROGRESS NOTES
.Orthopedic Surgery Office Note  Efrain Sun Jr. (53 y.o. male)   : 1970   MRN: [id number]   Encounter Date: 2024 with Dr. Ludin Walter,   Chief Complaint   Patient presents with    Right Knee - Pain     Pt states that he twisted his knee about three weeks ago at work and has had pain and swelling in the knee. Pt states that he worked on the knee for three weeks. Pt used topical lidocaine with no effect. Pt has tried elevation of the leg with no effect, pain in affecting sleep.        Assessment, Plan, & Discussion:     Right knee pain, internal derangement.  - Discussion was had with the patient regarding his right knee pathology.  Given his effusion in the knee, mechanical block and positive tony's, recommendation was made to the patient to undergo an MRI for further evaluation for internal derangement of the knee.  The patient understands and agrees with the plan.  No additional work restrictions at this time. The patient was offered a therapeutic knee aspiration given his knee effusion, but he declined at this time.  - MRI right knee ordered  - Diclofenac 50 mg PO ordered to assist with pain  - Follow up once the MRI is complete    Plan:   No follow-ups on file.    Surgery:   No surgery planned at this time      Orders:     Diagnoses and all orders for this visit:    Injury of right knee, initial encounter  -     Ambulatory Referral to Orthopedic Surgery         History of Present Illness:     Efrain Sun Jr. is a 53 y.o. male who presents for evaluation of his right knee pain as a workers compensation patient.  The patient reports that he works as a  for an apartment complex when 3 weeks ago he twisted his knee.  He states that he had immediate pain and no numbness or tingling.  He was able to bear weight and continued to work on the knee for 21 days. He has not had any injuries in the past. He has tried NSAIDs without benefit. He has had no injections,  "physical therapy or other formal treatment.      Review of Systems  Constitutional: Negative for fatigue, fever or loss of appetite.   HENT: Negative.    Respiratory: Negative for shortness of breath, dyspnea.    Cardiovascular: Negative for chest pain/tightness.   Gastrointestinal: Negative for abdominal pain, N/V.   Endocrine: Negative for cold/heat intolerance, unexplained weight loss/gain.   Genitourinary: Negative for flank pain, dysuria  Skin: Negative for rash.    Psychiatric/Behavioral: Negative for agitation.  All else negative unless otherwise noted in HPI    Physical Exam:   General:  BP (!) 178/82   Pulse 68   Ht 5' 8\" (1.727 m)   Wt 85.2 kg (187 lb 13.3 oz)   BMI 28.56 kg/m²   Cons: Appears well.  No apparent distress.  Psych: Alert. Oriented x3.  Mood and affect normal.  Eyes: PERRLA, EOMI  Resp: Normal effort.  No audible wheezing or stridor.  CV: Extremities warm and well perfused.   Abd:    No distention or guarding.   Skin: Warm. No visible lesions.  Neuro: Normal muscle tone.      Orthopedic Exam:   Right Knee Exam  Alignment:  Normal knee alignment.  Inspection:   minimal swelling. moderate effusion  Palpation:   anterior and superior knee tenderness. Mild lateral joint line tenderness  ROM:  Knee Extension lacks 10 deg of extension. Knee Flexion 90 deg .  Strength:  5/5 quadriceps and hamstrings.  Stability:  No objective knee instability. Stable Varus / Valgus stress, Lachman, and Posterior drawer.  Tests:  (+) Polina.  Patella:  Patella tracks centrally without crepitus.  Neurovascular:  Sensation intact in DP/SP/Harvey/Sa/T nerve distributions.  2+ DP & PT pulses.  Gait:  Normal.       Imaging/Studies:     Study: x-rays right knee  Date: 5/6/24  Report: I have read and agree with the radiologist report.  I have personally reviewed imaging and my impression is as follows: No evidence of acute fracture or dislocation. Chronic appearing ossification in the medial soft tissues adjacent to the " knee. Mild joint space narrowing consistent with mild osteoarthritis.    Procedures  No procedures today.      Medical, Surgical, Family, and Social History    The patient's medical history, family history, and social history, were reviewed and updated as appropriate.    History reviewed. No pertinent past medical history.  Past Surgical History:   Procedure Laterality Date    APPENDECTOMY      5 yrs old    KNEE SURGERY      TONSILLECTOMY      13 yrs old     Family History   Problem Relation Age of Onset    Dementia Mother         passed away     Social History     Occupational History    Not on file   Tobacco Use    Smoking status: Every Day     Current packs/day: 0.50     Types: Cigarettes    Smokeless tobacco: Never   Vaping Use    Vaping status: Never Used   Substance and Sexual Activity    Alcohol use: Not Currently    Drug use: Never    Sexual activity: Not on file     Allergies   Allergen Reactions    Penicillins Anaphylaxis       Current Outpatient Medications:     erythromycin (ILOTYCIN) ophthalmic ointment, Place a 1/2 inch ribbon of ointment into the lower eyelid 4 times a day for 5 days., Disp: 3.5 g, Rfl: 0    lisinopril (ZESTRIL) 10 mg tablet, Take 1 tablet (10 mg total) by mouth daily (Patient taking differently: Take 5 mg by mouth daily Take 1/2 tablet daily), Disp: 30 tablet, Rfl: 2    naproxen (Naprosyn) 500 mg tablet, Take 1 tablet (500 mg total) by mouth 2 (two) times a day with meals for 7 days, Disp: 14 tablet, Rfl: 0      This Visit:     30 minutes was spent in the coordination of care, reviewing of imaging and with the patient on the date of service    Arsh Richard MD    Scribe Attestation      I,:   am acting as a scribe while in the presence of the attending physician.:       I,:   personally performed the services described in this documentation    as scribed in my presence.:             Dr. Ludin Walter DO, Orthopedic Surgeon  [facility]

## 2024-05-22 ENCOUNTER — HOSPITAL ENCOUNTER (OUTPATIENT)
Dept: RADIOLOGY | Age: 54
Discharge: HOME | End: 2024-05-22
Payer: OTHER MISCELLANEOUS

## 2024-05-22 DIAGNOSIS — S89.91XA INJURY OF RIGHT KNEE, INITIAL ENCOUNTER: ICD-10-CM

## 2024-05-22 DIAGNOSIS — M23.91 INTERNAL DERANGEMENT OF RIGHT KNEE: ICD-10-CM

## 2024-05-22 PROCEDURE — 73721 MRI JNT OF LWR EXTRE W/O DYE: CPT

## 2024-05-23 ENCOUNTER — TELEPHONE (OUTPATIENT)
Age: 54
End: 2024-05-23

## 2024-05-23 NOTE — TELEPHONE ENCOUNTER
Patient called back and there was an appt 5/30 at 12:15 and I scheduled appt - no need to call back.

## 2024-05-23 NOTE — TELEPHONE ENCOUNTER
Hello,  Please advise if the following patient can be forced onto the schedule:    Patient: yahaira banerjee    : 74    MRN: [id number]    Call back #: [phone]    Insurance: w/c    Reason for appointment: needs follow up for mri    Requested doctor and/or location: [facility]      Thank you.

## 2024-05-30 ENCOUNTER — OFFICE VISIT (OUTPATIENT)
Dept: OBGYN CLINIC | Facility: MEDICAL CENTER | Age: 54
End: 2024-05-30
Payer: OTHER MISCELLANEOUS

## 2024-05-30 VITALS
DIASTOLIC BLOOD PRESSURE: 88 MMHG | WEIGHT: 187 LBS | BODY MASS INDEX: 28.34 KG/M2 | SYSTOLIC BLOOD PRESSURE: 144 MMHG | HEART RATE: 68 BPM | HEIGHT: 68 IN

## 2024-05-30 DIAGNOSIS — M17.11 PRIMARY OSTEOARTHRITIS OF RIGHT KNEE: Primary | ICD-10-CM

## 2024-05-30 PROCEDURE — 99214 OFFICE O/P EST MOD 30 MIN: CPT | Performed by: STUDENT IN AN ORGANIZED HEALTH CARE EDUCATION/TRAINING PROGRAM

## 2024-05-30 PROCEDURE — 20610 DRAIN/INJ JOINT/BURSA W/O US: CPT | Performed by: STUDENT IN AN ORGANIZED HEALTH CARE EDUCATION/TRAINING PROGRAM

## 2024-05-30 RX ADMIN — TRIAMCINOLONE ACETONIDE 20 MG: 40 INJECTION, SUSPENSION INTRA-ARTICULAR; INTRAMUSCULAR at 12:15

## 2024-05-30 RX ADMIN — LIDOCAINE HYDROCHLORIDE 2 ML: 10 INJECTION, SOLUTION INFILTRATION; PERINEURAL at 12:15

## 2024-05-30 RX ADMIN — BUPIVACAINE HYDROCHLORIDE 2 ML: 2.5 INJECTION, SOLUTION INFILTRATION; PERINEURAL at 12:15

## 2024-05-30 NOTE — PROGRESS NOTES
Orthopedic Surgery Office Note  Efrain Sun Jr. (53 y.o. male)   : 1970   MRN: [id number]   Encounter Date: 2024 with Dr. Ludin Walter,   Chief Complaint   Patient presents with    Right Knee - Follow-up     MRI review right knee. Pt states that he has the same pain and constant swelling in the knee.        Assessment, Plan, & Discussion:     Right knee medial compartment osteoarthritis with degenerative meniscus tear  - Discussion was had with the patient regarding his medial compartment osteoarthritis and his persistent knee pain.  The patient has tried and failed activity modifications, NSAIDs and rest.  Knee arthritis pathology and natural history was reviewed.  He was recommended to undergo a right knee CSI and physical therapy for range of motion exercises.  If that fails, he may consider arthroplasty.   - Right knee CSI provided  - PT script provided  - Return to clinic in 8 weeks for repeat evaluation    2. Smoking  - Smoking cessation counseling provided    Plan:   No follow-ups on file.    Surgery:   No surgery planned at this time      Orders:     There are no diagnoses linked to this encounter.     History of Present Illness:     Efrain Sun Jr. is a 53 y.o. male who presents for follow up regarding his right knee for review of his MRI.  The patient reports that he has had persistent knee pain and stiffness.  He has been using crutches.  He reports he has cut his smoking down to 0.5 pack per day.      Review of Systems  Constitutional: Negative for fatigue, fever or loss of appetite.   HENT: Negative.    Respiratory: Negative for shortness of breath, dyspnea.    Cardiovascular: Negative for chest pain/tightness.   Gastrointestinal: Negative for abdominal pain, N/V.   Endocrine: Negative for cold/heat intolerance, unexplained weight loss/gain.   Genitourinary: Negative for flank pain, dysuria  Skin: Negative for rash.    Psychiatric/Behavioral: Negative for agitation.  All  "else negative unless otherwise noted in HPI    Physical Exam:   General:  /88   Pulse 68   Ht 5' 8\" (1.727 m)   Wt 84.8 kg (187 lb)   BMI 28.43 kg/m²   Cons: Appears well.  No apparent distress.  Psych: Alert. Oriented x3.  Mood and affect normal.  Eyes: PERRLA, EOMI  Resp: Normal effort.  No audible wheezing or stridor.  CV: Extremities warm and well perfused.   Abd:    No distention or guarding.   Skin: Warm. No visible lesions.  Neuro: Normal muscle tone.      Orthopedic Exam:   Right knee  - skin intact  - no effusion  - TTP medial joint line  - stable varus/valgus stress, lachman and posterior drawer  - knee ROM 10 - 90 deg flexion/extension  - 2+ DP pulse      Imaging/Studies:     Study: MRI right knee  Date: 5/30/24  Report: I have read and agree with the radiologist report.  I have personally reviewed imaging and my impression is as follows: full thickness cartilage loss of the medial joint compartment with a small degenerative meniscus tear posteriorly. Mild arthritic change of the patellofemoral joint.  No cartilage wear of the lateral compartment and intact ligaments.    Large joint arthrocentesis: R knee  Universal Protocol:  Consent: Verbal consent obtained.  Risks and benefits: risks, benefits and alternatives were discussed  Consent given by: patient  Time out: Immediately prior to procedure a \"time out\" was called to verify the correct patient, procedure, equipment, support staff and site/side marked as required.  Patient understanding: patient states understanding of the procedure being performed  Patient consent: the patient's understanding of the procedure matches consent given  Procedure consent: procedure consent matches procedure scheduled  Relevant documents: relevant documents present and verified  Test results: test results available and properly labeled  Site marked: the operative site was marked  Radiology Images displayed and confirmed. If images not available, report reviewed: " imaging studies available  Required items: required blood products, implants, devices, and special equipment available  Patient identity confirmed: verbally with patient  Supporting Documentation  Indications: pain   Procedure Details  Location: knee - R knee  Needle size: 22 G  Ultrasound guidance: no  Approach: anteromedial  Medications administered: 2 mL bupivacaine 0.25 %; 2 mL lidocaine 1 %; 20 mg triamcinolone acetonide 40 mg/mL    Patient tolerance: patient tolerated the procedure well with no immediate complications  Dressing:  Sterile dressing applied                 Medical, Surgical, Family, and Social History    The patient's medical history, family history, and social history, were reviewed and updated as appropriate.    History reviewed. No pertinent past medical history.  Past Surgical History:   Procedure Laterality Date    APPENDECTOMY      5 yrs old    KNEE SURGERY      TONSILLECTOMY      13 yrs old     Family History   Problem Relation Age of Onset    Dementia Mother         passed away     Social History     Occupational History    Not on file   Tobacco Use    Smoking status: Every Day     Current packs/day: 0.50     Types: Cigarettes    Smokeless tobacco: Never   Vaping Use    Vaping status: Never Used   Substance and Sexual Activity    Alcohol use: Not Currently    Drug use: Never    Sexual activity: Not on file     Allergies   Allergen Reactions    Penicillins Anaphylaxis       Current Outpatient Medications:     diclofenac sodium (VOLTAREN) 50 mg EC tablet, Take 1 tablet (50 mg total) by mouth 2 (two) times a day for 10 days, Disp: 20 tablet, Rfl: 0    erythromycin (ILOTYCIN) ophthalmic ointment, Place a 1/2 inch ribbon of ointment into the lower eyelid 4 times a day for 5 days., Disp: 3.5 g, Rfl: 0    lisinopril (ZESTRIL) 10 mg tablet, Take 1 tablet (10 mg total) by mouth daily (Patient taking differently: Take 5 mg by mouth daily Take 1/2 tablet daily), Disp: 30 tablet, Rfl: 2      This  Visit:     30 minutes was spent in the coordination of care, reviewing of imaging and with the patient on the date of service    Arsh Richard MD    Scribe Attestation      I,:   am acting as a scribe while in the presence of the attending physician.:       I,:   personally performed the services described in this documentation    as scribed in my presence.:             Dr. Ludin Walter DO, Orthopedic Surgeon  [facility]

## 2024-05-31 RX ORDER — BUPIVACAINE HYDROCHLORIDE 2.5 MG/ML
2 INJECTION, SOLUTION INFILTRATION; PERINEURAL
Status: COMPLETED | OUTPATIENT
Start: 2024-05-30 | End: 2024-05-30

## 2024-05-31 RX ORDER — TRIAMCINOLONE ACETONIDE 40 MG/ML
20 INJECTION, SUSPENSION INTRA-ARTICULAR; INTRAMUSCULAR
Status: COMPLETED | OUTPATIENT
Start: 2024-05-30 | End: 2024-05-30

## 2024-05-31 RX ORDER — LIDOCAINE HYDROCHLORIDE 10 MG/ML
2 INJECTION, SOLUTION INFILTRATION; PERINEURAL
Status: COMPLETED | OUTPATIENT
Start: 2024-05-30 | End: 2024-05-30

## 2024-06-10 ENCOUNTER — OFFICE VISIT (OUTPATIENT)
Dept: OBGYN CLINIC | Facility: OTHER | Age: 54
End: 2024-06-10
Payer: COMMERCIAL

## 2024-06-10 ENCOUNTER — EVALUATION (OUTPATIENT)
Dept: PHYSICAL THERAPY | Facility: OTHER | Age: 54
End: 2024-06-10
Payer: OTHER MISCELLANEOUS

## 2024-06-10 VITALS
HEIGHT: 68 IN | DIASTOLIC BLOOD PRESSURE: 85 MMHG | HEART RATE: 87 BPM | WEIGHT: 198 LBS | BODY MASS INDEX: 30.01 KG/M2 | SYSTOLIC BLOOD PRESSURE: 149 MMHG

## 2024-06-10 DIAGNOSIS — M25.561 RIGHT KNEE PAIN, UNSPECIFIED CHRONICITY: ICD-10-CM

## 2024-06-10 DIAGNOSIS — M17.11 PRIMARY OSTEOARTHRITIS OF RIGHT KNEE: Primary | ICD-10-CM

## 2024-06-10 DIAGNOSIS — M75.01 ADHESIVE CAPSULITIS OF RIGHT SHOULDER: Primary | ICD-10-CM

## 2024-06-10 PROCEDURE — 99213 OFFICE O/P EST LOW 20 MIN: CPT | Performed by: ORTHOPAEDIC SURGERY

## 2024-06-10 PROCEDURE — 97110 THERAPEUTIC EXERCISES: CPT

## 2024-06-10 PROCEDURE — 97162 PT EVAL MOD COMPLEX 30 MIN: CPT

## 2024-06-10 NOTE — PROGRESS NOTES
PT Evaluation     Today's date: 6/10/2024  Patient name: Efrain Sun Jr.  : 1970  MRN: [id number]  Referring provider: Ludin Walter DO  Dx:   Encounter Diagnosis     ICD-10-CM    1. Primary osteoarthritis of right knee  M17.11 Ambulatory Referral to Physical Therapy      2. Right knee pain, unspecified chronicity  M25.561           Start Time: 1630  Stop Time: 1710  Total time in clinic (min): 40 minutes    Assessment  Impairments: abnormal coordination, abnormal gait, abnormal muscle firing, abnormal muscle tone, abnormal or restricted ROM, abnormal movement, activity intolerance, impaired balance, impaired physical strength, lacks appropriate home exercise program, pain with function, weight-bearing intolerance, poor posture , poor body mechanics, unable to perform ADL, participation limitations, activity limitations and endurance  Symptom irritability: high    Assessment details: Problem List:  1) R Knee Hypomobility  2) R Knee neuromotor/force deficits    Efrain Sun Jr. is a pleasant 53 y.o. male who presents with right knee pain following twisting his knee at work a month ago. He presents upon exam with problem list noted above, nociceptive pain majority, and diagnosis of right knee OA resulting in the pain he is experiencing, worry over not knowing what's wrong, concern at no signs of improvement, fear of not being able to keep active, and future ill health (and wanting to prevent it). No further referral appears necessary at this time based upon examination results. I expect he will demonstrate mild improvements based on symptom presentation over 8-12 weeks. Positive prognostic indicators include good understanding of diagnosis and treatment plan options, acuity of symptoms, absence of peripheralization, and absence of observed red flags. Negative prognostic indicators include anxiety, hypertension, high symptom irritability, minimal changes in pain with movement, multiple concurrent  orthopedic problems, and multiple prior failed treatments. Patient was provided with a customized home exercise program to begin performing on their own. All patient questions and concerns have been addressed at this time. Thank you for the kind referral.    Comparable signs:  1) Ambulatory dysfunction - single crutch L UE  2) R Knee Flex < 90 deg  Understanding of Dx/Px/POC: good     Prognosis: fair    Goals  Short Term Goals:   1. Patient will be independent with a customized HEP.  2. Patient will demonstrate R Knee Ext ROM within 5 deg of full ext.  3. Patient will demonstrate R Knee Flex ROM > 110 deg.  4. Patient will demonstrate 4/5 MMT or better of R Knee Flex/Ext.      Long Term Goals:   1. Patient will improve pain with activity to 5/10 or less.  2. Patient will continue with HEP independence to allow for decreased future reoccurrence of pain and loss in function.  3. Patient will be able to ambulate without use of AD.     Plan  Patient would benefit from: PT eval and skilled physical therapy  Planned modality interventions: cryotherapy, TENS, thermotherapy: hydrocollator packs and low level laser therapy    Planned therapy interventions: manual therapy, massage, joint mobilization, coordination, graded exercise, graded activity, functional ROM exercises, therapeutic exercise, therapeutic activities, patient education, neuromuscular re-education, home exercise program, flexibility and strengthening    Frequency: 1-2x/week.  Duration in weeks: 12  Plan of Care beginning date: 6/10/2024  Plan of Care expiration date: 9/2/2024  Treatment plan discussed with: patient  Plan details: Prognosis above is given PT services 2x/week tapering to 1x/week over the next 3 months and home program adherence.        Subjective Evaluation    History of Present Illness  Mechanism of injury: trauma  Mechanism of injury: Patient is a 53 y.o. male presenting to physical therapy with chief complaint of right knee pain after  "twisting it at work last month. Patient reports he received a CSI which helped for a day or two but his pain came right back and feels that it is hurting more now. Patient reports feeling as though surgery is the only thing that can make this better. Patient reports feeling fearful of making things worse. 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   Quality of life: fair    Patient Goals  Patient goals for therapy: independence with ADLs/IADLs, decreased pain, increased strength, improved balance, increased motion, return to sport/leisure activities, return to work and decreased edema  Patient goal: \"I want to get back to normal without pain, get back to work, and get back to bowling.\"  Pain  Current pain ratin  At best pain ratin  At worst pain ratin  Location: surrounding right knee  Quality: knife-like, throbbing, sharp, pulling, squeezing and pressure  Relieving factors: rest and support (elevating)  Aggravating factors: standing, walking, stair climbing, running and lifting (any R knee movement)  Progression: worsening    Social Support    Employment status: not working ()  Exercise history: Bowling    Treatments  Previous treatment: injection treatment        Objective    Observation: Mild swelling noted surrounding right knee  Palpation: TTP surrounding R knee/joint lines          GAIT: Antalgic with single crutch L UE - lean towards left LE  Squat assess: NT  Single leg Squat: NT  Singe Leg Stance: NT    Meniscal Tests:  Catching/Clicking/Locking yes  Joint Line Tenderness: yes  Pain with knee flexion overpressure: yes  Pain with knee extension overpressure: yes        MMT         AROM         PROM    Hip       L     R         L       R          L         R   Flex.         Extn.         Abd.         Add.         IR.         ER.         G. Max         G. Med         Iliop. " "        .         Knee         Extension 5 2+ P!   WFL -16 deg P!   Flexion 5 2+ P!   WFL 75 deg P!            Ankle         Dorsi Flexion 5 5       Plantar Flexion           Special Test:  Ely Test: P! R LE      90/90 Test: P! R LE     Segmental mobility:   Patella: R Hypomobile P!         Precautions: HTN. Prior surgeries of R Knee.     POC expires Unit limit Auth Expiration date PT/OT + Visit Limit?   9/2/24 BOMN TBA BOMN                             Visit/Unit Tracking  AUTH Status:  Date 6/10              TBA Used 1               Remaining                  Visit 1 IE            Manuals 6/10            Soft Tissue Deformation             Knee PROM             Patellar Mobs                                       Assessment             Neuro Re-Ed             Quad Set             Glute Set             SAQ             LAQ Mid range 2x10                                                   Ther Ex             Bike 4' rocking gentle            Pball curl Green 2x10 mid range            Long sit heel prop calf stretch 3'            Heel Slide 15x10\"                                                                Ther Activity                                       Gait Training                                       Modalities                                            "

## 2024-06-10 NOTE — PROGRESS NOTES
"  Assessment  Diagnoses and all orders for this visit:    Post traumatic Adhesive capsulitis of right shoulder    Discussion and Plan:    Patient's symptoms have resolved.  Progress activities as tolerated  Follow up as needed  He is released from our care at this time and a note was provided for this  He is under the care of Dr. Walter for his knee which continues to bother him significantly and he may schedule an appointment to see one of the arthroplasty surgeons Dr. Ivan to consider a unicompartmental arthroplasty    Subjective:   Patient ID: Efrain Sun Jr. is a 53 y.o. male      HPI    Patient presents today for follow up regarding right shoulder adhesive capsulitis.  Symptoms started after a fall 1/20/24.   Patient states overall he is doing well.  ROM has improved significantly and pain has resolved.      Patient was provided with a CS injection 2/22/24 and instructed to continue his home stretching program. MRI does not show any structural abnormality that occurred from the fall and the lytic lesions seen on plain x-ray are not appreciated on MRI which tells us they are not clinically significan     The following portions of the patient's history were reviewed and updated as appropriate: allergies, current medications, past family history, past medical history, past social history, past surgical history and problem list.        Objective:  /85 (BP Location: Left arm, Patient Position: Sitting, Cuff Size: Standard)   Pulse 87   Ht 5' 8\" (1.727 m)   Wt 89.8 kg (198 lb)   BMI 30.11 kg/m²       Right Shoulder Exam     Tenderness   The patient is experiencing no tenderness.    Range of Motion   The patient has normal right shoulder ROM.    Muscle Strength   Abduction: 5/5   Internal rotation: 5/5   External rotation: 5/5     Other   Erythema: absent  Sensation: normal  Pulse: present            Physical Exam  Vitals reviewed.   Constitutional:       Appearance: He is well-developed.   HENT:     "  Head: Normocephalic.   Eyes:      Pupils: Pupils are equal, round, and reactive to light.   Pulmonary:      Effort: Pulmonary effort is normal.   Abdominal:      General: Abdomen is flat. There is no distension.   Skin:     General: Skin is warm and dry.           I have personally reviewed pertinent films in PACS and my interpretation is as follows.    MRI R shoulder demonstrates no changes in bone in location of lucencies visualized on XR. No rotator cuff tear. Thickening of middle glenohumeral ligament.     Scribe Attestation      I,:  Sharla Shea am acting as a scribe while in the presence of the attending physician.:       I,:  Torsten Garcia MD personally performed the services described in this documentation    as scribed in my presence.:

## 2024-06-10 NOTE — LETTER
Brandi 10, 2024     Patient: Efrain Sun .  YOB: 1970  Date of Visit: 6/10/2024      To Whom it May Concern:    Efrain Sun is under my professional care. Efrain was seen in my office on 6/10/2024. Issues with the right shoulder have resolved and he may return to unrestricted activity in reference to the right shoulder.     If you have any questions or concerns, please don't hesitate to call.         Sincerely,          Torsten Garcia MD        CC: No Recipients

## 2024-06-13 ENCOUNTER — APPOINTMENT (OUTPATIENT)
Dept: PHYSICAL THERAPY | Facility: OTHER | Age: 54
End: 2024-06-13
Payer: OTHER MISCELLANEOUS

## 2024-06-17 ENCOUNTER — OFFICE VISIT (OUTPATIENT)
Dept: PHYSICAL THERAPY | Facility: OTHER | Age: 54
End: 2024-06-17
Payer: OTHER MISCELLANEOUS

## 2024-06-17 DIAGNOSIS — M25.561 RIGHT KNEE PAIN, UNSPECIFIED CHRONICITY: ICD-10-CM

## 2024-06-17 DIAGNOSIS — M17.11 PRIMARY OSTEOARTHRITIS OF RIGHT KNEE: Primary | ICD-10-CM

## 2024-06-17 PROCEDURE — 97110 THERAPEUTIC EXERCISES: CPT | Performed by: PEDIATRICS

## 2024-06-17 PROCEDURE — 97140 MANUAL THERAPY 1/> REGIONS: CPT | Performed by: PEDIATRICS

## 2024-06-17 NOTE — PROGRESS NOTES
"Daily Note     Today's date: 2024  Patient name: Efrain Sun Jr.  : 1970  MRN: [id number]  Referring provider: Ludin Walter DO  Dx:   Encounter Diagnosis     ICD-10-CM    1. Primary osteoarthritis of right knee  M17.11       2. Right knee pain, unspecified chronicity  M25.561                      Subjective: Patient states he continues to have 7/10 pain pretty consistently. Has been able to manage pain with heating pad which helps bring pain levels nell to 4-5/10 and helps him to fall asleep at night.       Objective: See treatment diary below      Assessment: Tolerated treatment well. Focused on ROM and decreasing pain levels. Patient demonstrated pain throughout session but reported pain as tolerable. Trial of heat followed by IASTM prior to stretching. Patient demonstrated improved ROM with seated knee flex over EOT vs heel slides. Patient demonstrated fatigue post treatment, exhibited good technique with therapeutic exercises, and would benefit from continued PT      Plan: Continue per plan of care.      Precautions: HTN. Prior surgeries of R Knee.     POC expires Unit limit Auth Expiration date PT/OT + Visit Limit?   24 BOMN TBA BOMN                               Visit 1 IE 2           Manuals 6/10 6/17           Soft Tissue Deformation  EW           Knee PROM  EW           Patellar Mobs  EW                                     Assessment             Neuro Re-Ed             Quad Set             Glute Set             SAQ             LAQ Mid range 2x10                                                   Ther Ex             Bike 4' rocking gentle 5' rocking gentle           Pball curl Green 2x10 mid range            Long sit heel prop calf stretch 3'            Heel Slide 15x10\" 15x10\"           Seated self knee flex stretch  10\"x10                                                  Ther Activity                                       Gait Training                                     "   Modalities             P  10'

## 2024-06-25 ENCOUNTER — OFFICE VISIT (OUTPATIENT)
Dept: PHYSICAL THERAPY | Facility: OTHER | Age: 54
End: 2024-06-25
Payer: OTHER MISCELLANEOUS

## 2024-06-25 DIAGNOSIS — M25.561 RIGHT KNEE PAIN, UNSPECIFIED CHRONICITY: ICD-10-CM

## 2024-06-25 DIAGNOSIS — M17.11 PRIMARY OSTEOARTHRITIS OF RIGHT KNEE: Primary | ICD-10-CM

## 2024-06-25 PROCEDURE — 97110 THERAPEUTIC EXERCISES: CPT

## 2024-06-25 NOTE — PROGRESS NOTES
"Daily Note     Today's date: 2024  Patient name: Efrain Sun Jr.  : 1970  MRN: [id number]  Referring provider: Ludin Walter DO  Dx:   Encounter Diagnosis     ICD-10-CM    1. Primary osteoarthritis of right knee  M17.11       2. Right knee pain, unspecified chronicity  M25.561           Start Time: 1730  Stop Time: 1805  Total time in clinic (min): 35 minutes  1 on 1 for 23 minutes all else IEP     Subjective: Patient reports he continues to have high amounts of pain and unsteadiness when ambulating - which he explains is why he still uses a single crutch.       Objective: See treatment diary below      Assessment: Tolerated treatment well with continued focus on gentle ROM/stretching to tolerance - held off on manuals this visit as patient felt increased soreness following manuals last visit. Patient demonstrated fatigue post treatment, exhibited good technique with therapeutic exercises, and would benefit from continued PT      Plan: Continue per plan of care.      Precautions: HTN. Prior surgeries of R Knee.     POC expires Unit limit Auth Expiration date PT/OT + Visit Limit?   24 BOMN TBA BOMN                               Visit 1 IE 2 3          Manuals 6/10 6/17 6/25          Soft Tissue Deformation  EW           Knee PROM  EW           Patellar Mobs  EW                                     Assessment             Neuro Re-Ed             Quad Set             Glute Set             SAQ             LAQ Mid range 2x10  Self assist 2x10                                                 Ther Ex             Bike 4' rocking gentle 5' rocking gentle 8' rocking gentle          Pball curl Green 2x10 mid range            Long sit heel prop calf stretch 3'  20x10\"           Heel Slide 15x10\" 15x10\" 20x10\"          Seated self knee flex stretch  10\"x10 10x10\"                                                  Ther Activity                                       Gait Training                                "        Modalities             P  10'

## 2024-07-01 ENCOUNTER — OFFICE VISIT (OUTPATIENT)
Dept: PHYSICAL THERAPY | Facility: OTHER | Age: 54
End: 2024-07-01
Payer: OTHER MISCELLANEOUS

## 2024-07-01 DIAGNOSIS — M25.561 RIGHT KNEE PAIN, UNSPECIFIED CHRONICITY: ICD-10-CM

## 2024-07-01 DIAGNOSIS — M17.11 PRIMARY OSTEOARTHRITIS OF RIGHT KNEE: Primary | ICD-10-CM

## 2024-07-01 PROCEDURE — 97140 MANUAL THERAPY 1/> REGIONS: CPT

## 2024-07-01 PROCEDURE — 97110 THERAPEUTIC EXERCISES: CPT

## 2024-07-01 PROCEDURE — 97112 NEUROMUSCULAR REEDUCATION: CPT

## 2024-07-01 NOTE — PROGRESS NOTES
"Daily Note     Today's date: 2024  Patient name: Efrain Sun Jr.  : 1970  MRN: [id number]  Referring provider: Ludin Walter DO  Dx:   Encounter Diagnosis     ICD-10-CM    1. Primary osteoarthritis of right knee  M17.11       2. Right knee pain, unspecified chronicity  M25.561           Start Time: 1740  Stop Time: 1810  Total time in clinic (min): 30 minutes    Subjective: Patient reports feeling no improvement and is concerned about popping/clicking/locking of his knee. Patient reports feeling concerned about if he is doing more harm to his knee. \"I just know I'm going to need surgery.\"       Objective: See treatment diary below      Assessment: Tolerated treatment fair with continued focus on gentle ROM/stretching to tolerance. Limited in ability to participate in treatment secondary to intense pain. Patient demonstrated fatigue post treatment, exhibited good technique with therapeutic exercises, and would benefit from continued PT.       Plan: Continue per plan of care.      Precautions: HTN. Prior surgeries of R Knee.     POC expires Unit limit Auth Expiration date PT/OT + Visit Limit?   24 BOMN TBA BOMN                               Visit 1 IE 2 3 4         Manuals 6/10 6/17 6/25 7/1         Soft Tissue Deformation  EW           Knee PROM  EW  GJL         Patellar Mobs  EW  NV?                                   Assessment             Neuro Re-Ed             Quad Set             Glute Set             SAQ             LAQ Mid range 2x10  Self assist 2x10 PT assist 15x         Isometrics    Quad/HS edge of table 10x ea P!                                   Ther Ex             Bike 4' rocking gentle 5' rocking gentle 8' rocking gentle 8' rocking gentle         Pball curl Green 2x10 mid range            Long sit heel prop calf stretch 3'  20x10\"  10x10\"         Heel Slide 15x10\" 15x10\" 20x10\" 10x10\"         Seated self knee flex stretch  10\"x10 10x10\"                                           "        Ther Activity                                       Gait Training                                       Modalities             P  10'

## 2024-07-08 ENCOUNTER — OFFICE VISIT (OUTPATIENT)
Dept: PHYSICAL THERAPY | Facility: OTHER | Age: 54
End: 2024-07-08
Payer: OTHER MISCELLANEOUS

## 2024-07-08 DIAGNOSIS — M25.561 RIGHT KNEE PAIN, UNSPECIFIED CHRONICITY: ICD-10-CM

## 2024-07-08 DIAGNOSIS — M17.11 PRIMARY OSTEOARTHRITIS OF RIGHT KNEE: Primary | ICD-10-CM

## 2024-07-08 PROCEDURE — 97112 NEUROMUSCULAR REEDUCATION: CPT

## 2024-07-08 PROCEDURE — 97110 THERAPEUTIC EXERCISES: CPT

## 2024-07-08 NOTE — PROGRESS NOTES
"Daily Note     Today's date: 2024  Patient name: Efrain Sun Jr.  : 1970  MRN: [id number]  Referring provider: Ludin Walter DO  Dx:   Encounter Diagnosis     ICD-10-CM    1. Primary osteoarthritis of right knee  M17.11       2. Right knee pain, unspecified chronicity  M25.561           Start Time: 1645  Stop Time: 1720  Total time in clinic (min): 35 minutes  1 on 1 [id number] for 31 minutes     Subjective: Patient reports his knee locked on him in a slightly flexed position causing him a lot of pain. He reports his knee stayed locked for about 1-2 hours with heating pad helping unlock his knee.       Objective: See treatment diary below      Assessment: Tolerated treatment fair with continued focus on gentle ROM/stretching to tolerance. Once again limited in ability to participate in treatment secondary to severe pain. Patient demonstrated fatigue post treatment, exhibited good technique with therapeutic exercises, and would benefit from continued PT.       Plan: Continue per plan of care.      Precautions: HTN. Prior surgeries of R Knee.     POC expires Unit limit Auth Expiration date PT/OT + Visit Limit?   24 BOMN TBA BOMN                             Visit 1 IE 2 3 4 5        Manuals 6/10 6/17 6/25 7/1 7/8        Soft Tissue Deformation  EW           Knee PROM  EW  GJL         Patellar Mobs  EW  NV?                                   Assessment             Neuro Re-Ed             Quad Set     TC 20x5\"        Glute Set             SAQ             LAQ Mid range 2x10  Self assist 2x10 PT assist 15x Self assist 20x        Isometrics    Quad/HS edge of table 10x ea P!                                   Ther Ex             Bike 4' rocking gentle 5' rocking gentle 8' rocking gentle 8' rocking gentle 8' rocking gentle        Pball curl Green 2x10 mid range            Long sit heel prop calf stretch 3'  20x10\"  10x10\" 15x10\"        Heel Slide 15x10\" 15x10\" 20x10\" 10x10\" 15x10\"        Seated self " "knee flex stretch  10\"x10 10x10\"                                                  Ther Activity                                       Gait Training                                       Modalities             P  10'                             "

## 2024-07-15 ENCOUNTER — APPOINTMENT (OUTPATIENT)
Dept: PHYSICAL THERAPY | Facility: OTHER | Age: 54
End: 2024-07-15
Payer: OTHER MISCELLANEOUS

## 2024-07-25 ENCOUNTER — OFFICE VISIT (OUTPATIENT)
Dept: OBGYN CLINIC | Facility: MEDICAL CENTER | Age: 54
End: 2024-07-25
Payer: OTHER MISCELLANEOUS

## 2024-07-25 VITALS
HEART RATE: 92 BPM | BODY MASS INDEX: 30.46 KG/M2 | DIASTOLIC BLOOD PRESSURE: 77 MMHG | HEIGHT: 68 IN | SYSTOLIC BLOOD PRESSURE: 133 MMHG | WEIGHT: 201 LBS

## 2024-07-25 DIAGNOSIS — M17.11 PRIMARY OSTEOARTHRITIS OF RIGHT KNEE: Primary | ICD-10-CM

## 2024-07-25 PROCEDURE — 99213 OFFICE O/P EST LOW 20 MIN: CPT | Performed by: STUDENT IN AN ORGANIZED HEALTH CARE EDUCATION/TRAINING PROGRAM

## 2024-07-25 RX ORDER — IBUPROFEN 200 MG
200 TABLET ORAL EVERY 6 HOURS PRN
COMMUNITY

## 2024-07-25 NOTE — PROGRESS NOTES
Orthopedic Surgery Office Note  Efrain Sun Jr. (53 y.o. male)   : 1970   MRN: [id number]   Encounter Date: 2024 with Dr. Ludin Walter,   Chief Complaint   Patient presents with    Right Knee - Follow-up       Assessment, Plan, & Discussion:     Right knee medial compartment osteoarthritis with degenerative meniscus tear  The patient would like to pursue unicompartmental arthroplasty.  The patient was referred to Dr. Ivan for further evaluation and discussion.     2. Smoking  Smoking cessation counseling previously provided     Plan:   Return if symptoms worsen or fail to improve.    Surgery:   No surgery planned at this time      Orders:     Diagnoses and all orders for this visit:    Primary osteoarthritis of right knee  -     Ambulatory Referral to Orthopedic Surgery; Future    Other orders  -     ibuprofen (MOTRIN) 200 mg tablet; Take 200 mg by mouth every 6 (six) hours as needed for mild pain         History of Present Illness:     Efrain Sun Jr. is a 53 y.o. male who presents for follow up regarding right knee osteoarthritis status post corticosteroid injection on 2024. The patient has been attending physical therapy. He has no improvement in symptoms. He has been using ice and heat as well. He has required to use a single crutch for ambulation as he is not able to bear weight significantly on the right lower extremity.      Review of Systems  Constitutional: Negative for fatigue, fever or loss of appetite.   HENT: Negative.    Respiratory: Negative for shortness of breath, dyspnea.    Cardiovascular: Negative for chest pain/tightness.   Gastrointestinal: Negative for abdominal pain, N/V.   Endocrine: Negative for cold/heat intolerance, unexplained weight loss/gain.   Genitourinary: Negative for flank pain, dysuria  Skin: Negative for rash.    Psychiatric/Behavioral: Negative for agitation.  All else negative unless otherwise noted in HPI    Physical Exam:   General:  BP  "133/77   Pulse 92   Ht 5' 8\" (1.727 m)   Wt 91.2 kg (201 lb)   BMI 30.56 kg/m²   Cons: Appears well.  No apparent distress.  Psych: Alert. Oriented x3.  Mood and affect normal.  Eyes: PERRLA, EOMI  Resp: Normal effort.  No audible wheezing or stridor.  CV: Extremities warm and well perfused.   Abd:    No distention or guarding.   Skin: Warm. No visible lesions.  Neuro: Normal muscle tone.      Orthopedic Exam:  Right knee  - skin intact  - no effusion  - TTP medial joint line  - stable varus/valgus stress, lachman and posterior drawer  - knee ROM 10 - 90 deg flexion/extension  - 2+ DP pulse      Imaging/Studies:     Study: MRI right knee  Date: 5/30/24  Report: I have read and agree with the radiologist report.  I have personally reviewed imaging and my impression is as follows: full thickness cartilage loss of the medial joint compartment with a small degenerative meniscus tear posteriorly. Mild arthritic change of the patellofemoral joint.  No cartilage wear of the lateral compartment and intact ligaments.        Medical, Surgical, Family, and Social History    The patient's medical history, family history, and social history, were reviewed and updated as appropriate.    No past medical history on file.  Past Surgical History:   Procedure Laterality Date    APPENDECTOMY      5 yrs old    KNEE SURGERY      TONSILLECTOMY      13 yrs old     Family History   Problem Relation Age of Onset    Dementia Mother         passed away     Social History     Occupational History    Not on file   Tobacco Use    Smoking status: Every Day     Current packs/day: 0.50     Types: Cigarettes    Smokeless tobacco: Never   Vaping Use    Vaping status: Never Used   Substance and Sexual Activity    Alcohol use: Not Currently    Drug use: Never    Sexual activity: Not on file     Allergies   Allergen Reactions    Penicillins Anaphylaxis       Current Outpatient Medications:     ibuprofen (MOTRIN) 200 mg tablet, Take 200 mg by mouth " every 6 (six) hours as needed for mild pain, Disp: , Rfl:     diclofenac sodium (VOLTAREN) 50 mg EC tablet, Take 1 tablet (50 mg total) by mouth 2 (two) times a day for 10 days, Disp: 20 tablet, Rfl: 0    erythromycin (ILOTYCIN) ophthalmic ointment, Place a 1/2 inch ribbon of ointment into the lower eyelid 4 times a day for 5 days., Disp: 3.5 g, Rfl: 0    lisinopril (ZESTRIL) 10 mg tablet, Take 1 tablet (10 mg total) by mouth daily (Patient taking differently: Take 5 mg by mouth daily Take 1/2 tablet daily), Disp: 30 tablet, Rfl: 2      This Visit:     30 minutes was spent in the coordination of care, reviewing of imaging and with the patient on the date of service    Janet Hannah    Scribe Attestation      I,:  Janet Hannah am acting as a scribe while in the presence of the attending physician.:       I,:  Ludin Walter DO personally performed the services described in this documentation    as scribed in my presence.:             Dr. Ludin Walter DO, Orthopedic Surgeon  [facility]

## 2024-07-26 ENCOUNTER — TELEPHONE (OUTPATIENT)
Age: 54
End: 2024-07-26

## 2024-07-26 NOTE — TELEPHONE ENCOUNTER
Caller: Antoinette - Patient Spouse    Doctor: Jose Angel    Reason for call: Calling for two reasons; first they would like to know if an updated work status note could be made and made available on the patient's MyChart, as his employer would like an update.    Secondly they requested a good fax number to send patient paperwork to, and requested I provide a heads up to expect this.    Please call patient to advise when work note is ready.    Call back#: [phone]

## 2024-07-30 ENCOUNTER — TELEPHONE (OUTPATIENT)
Age: 54
End: 2024-07-30

## 2024-07-30 NOTE — TELEPHONE ENCOUNTER
Caller: Darren at [facility]    Doctor: Jose Angel    Reason for call:     Requesting office notes, faxed to [phone].    Call back#: n/a

## 2024-08-07 NOTE — TELEPHONE ENCOUNTER
Caller: Jeffery from [facility]     Doctor: Dr. Moreno    Reason for call: OVN from 7/25/24.    Attn: Jfefery  Fax: [phone]    Call back#: [phone]

## 2024-08-08 ENCOUNTER — TELEPHONE (OUTPATIENT)
Age: 54
End: 2024-08-08

## 2024-08-08 NOTE — TELEPHONE ENCOUNTER
Caller: Patients wife Antoinette    Doctor: Jose Angel    Reason for call: Antoinette is asking for the status of Efrain's [place] ppwk. PPWK was received on 7/26/24. I did tell Antoinette the office has 14 days to completed the ppwk.     Call back#: [phone]

## 2024-08-09 NOTE — TELEPHONE ENCOUNTER
Caller:Efrain    Doctor: Jose Angel    Reason for call: Calling back to check the status of STD forms that where brought in on 7/25/2024- his employer is stating that they need them back by the end.    Call back#: [phone]

## 2024-08-12 NOTE — TELEPHONE ENCOUNTER
Caller: Patient    Doctor: Jose Angel    Reason for call:     Patient is calling back with the fax number for Jaci, [phone].  He is stating they need it today or he will loose his job.  He would also like a copy of the paperwork.    Call back#: [phone]

## 2024-08-27 ENCOUNTER — OFFICE VISIT (OUTPATIENT)
Dept: OBGYN CLINIC | Facility: MEDICAL CENTER | Age: 54
End: 2024-08-27
Payer: OTHER MISCELLANEOUS

## 2024-08-27 VITALS
WEIGHT: 203 LBS | RESPIRATION RATE: 18 BRPM | SYSTOLIC BLOOD PRESSURE: 142 MMHG | HEIGHT: 68 IN | BODY MASS INDEX: 30.77 KG/M2 | DIASTOLIC BLOOD PRESSURE: 92 MMHG | HEART RATE: 92 BPM

## 2024-08-27 DIAGNOSIS — M17.11 PRIMARY OSTEOARTHRITIS OF RIGHT KNEE: ICD-10-CM

## 2024-08-27 DIAGNOSIS — M95.8 OSTEOCHONDRAL DEFECT OF CONDYLE OF FEMUR: Primary | ICD-10-CM

## 2024-08-27 PROBLEM — E66.09 CLASS 1 OBESITY DUE TO EXCESS CALORIES WITHOUT SERIOUS COMORBIDITY WITH BODY MASS INDEX (BMI) OF 30.0 TO 30.9 IN ADULT: Status: ACTIVE | Noted: 2024-08-27

## 2024-08-27 PROBLEM — E66.811 CLASS 1 OBESITY DUE TO EXCESS CALORIES WITHOUT SERIOUS COMORBIDITY WITH BODY MASS INDEX (BMI) OF 30.0 TO 30.9 IN ADULT: Status: ACTIVE | Noted: 2024-08-27

## 2024-08-27 PROCEDURE — 99215 OFFICE O/P EST HI 40 MIN: CPT | Performed by: STUDENT IN AN ORGANIZED HEALTH CARE EDUCATION/TRAINING PROGRAM

## 2024-08-27 RX ORDER — MELOXICAM 15 MG/1
15 TABLET ORAL DAILY
Qty: 60 TABLET | Refills: 1 | Status: SHIPPED | OUTPATIENT
Start: 2024-08-27

## 2024-08-27 RX ORDER — SODIUM CHLORIDE, SODIUM LACTATE, POTASSIUM CHLORIDE, CALCIUM CHLORIDE 600; 310; 30; 20 MG/100ML; MG/100ML; MG/100ML; MG/100ML
125 INJECTION, SOLUTION INTRAVENOUS CONTINUOUS
OUTPATIENT
Start: 2024-08-27

## 2024-08-27 RX ORDER — CLINDAMYCIN PHOSPHATE 900 MG/50ML
900 INJECTION, SOLUTION INTRAVENOUS ONCE
OUTPATIENT
Start: 2024-08-27 | End: 2024-08-27

## 2024-08-27 RX ORDER — MULTIVIT-MIN/IRON FUM/FOLIC AC 7.5 MG-4
1 TABLET ORAL DAILY
Qty: 30 TABLET | Refills: 1 | Status: SHIPPED | OUTPATIENT
Start: 2024-08-27

## 2024-08-27 RX ORDER — ACETAMINOPHEN 325 MG/1
975 TABLET ORAL ONCE
OUTPATIENT
Start: 2024-08-27 | End: 2024-08-27

## 2024-08-27 RX ORDER — FOLIC ACID 1 MG/1
1 TABLET ORAL DAILY
Qty: 30 TABLET | Refills: 1 | Status: SHIPPED | OUTPATIENT
Start: 2024-08-27

## 2024-08-27 RX ORDER — TRANEXAMIC ACID 10 MG/ML
1000 INJECTION, SOLUTION INTRAVENOUS ONCE
OUTPATIENT
Start: 2024-08-27 | End: 2024-08-27

## 2024-08-27 RX ORDER — CHLORHEXIDINE GLUCONATE 40 MG/ML
SOLUTION TOPICAL DAILY PRN
OUTPATIENT
Start: 2024-08-27

## 2024-08-27 RX ORDER — CHLORHEXIDINE GLUCONATE ORAL RINSE 1.2 MG/ML
15 SOLUTION DENTAL ONCE
OUTPATIENT
Start: 2024-08-27 | End: 2024-08-27

## 2024-08-27 RX ORDER — ASCORBIC ACID 500 MG
500 TABLET ORAL 2 TIMES DAILY
Qty: 60 TABLET | Refills: 1 | Status: SHIPPED | OUTPATIENT
Start: 2024-08-27

## 2024-08-27 NOTE — PROGRESS NOTES
Internal Medicine Pre-Operative Evaluation:     Reason for Visit: Pre-operative Evaluation for Risk Stratification and Optimization    Patient ID: Efrain Sun Jr. is a 54 y.o. male.     Surgery: Arthroplasty of right knee  Referring Provider: Dr Ivan      Recommendations to Proceed withSurgery    Patient is considered to be Low risk for Medium risk procedure.     After evaluation and discussion with patient with emphasis that all surgery has some degree of inherent risk it is acknowledged by patient this risk is Acceptable.    Patient is optimized and may proceed with planned procedure.     Assessment    Pre-operative Medical Evaluation for planned surgery  Recommendations as listed in PLAN section below  Contact surgical nurse  navigator with any questions regarding preoperative plan or schedule.      Problem List Items Addressed This Visit          Cardiovascular and Mediastinum    Essential hypertension     Stable  Monitor post operative BP   Avoid hypotension if at all possible  Refer to PAT instructions regarding medication administration the morning of surgery              Musculoskeletal and Integument    Primary osteoarthritis of right knee     Failed outpatient conservative measures  Electing to undergo arthroplasty              Other    Class 1 obesity due to excess calories without serious comorbidity with body mass index (BMI) of 30.0 to 30.9 in adult     Recommend ongoing attempts at weight loss  Current BMI meets criteria of <40 per [facility] preoperative qualifications            Other Visit Diagnoses       Preoperative clearance    -  Primary                 Plan:     1. Further preoperative workup as follows:   - none no further testing required may proceed with surgery    2. Preoperative Medication Management Review performed by PAT nursing  NO    3. Patient requires further consultation with:   No Consults Required    4. Discharge Planning / Barriers to Discharge  none identified - patients  "has post discharge therapy plan in place, transportation arranged for discharge day, adequate family support at home to assist with discharge to home.        Subjective:           History of Present Illness:     Efrain Sun Jr. is a 54 y.o. male who presents to the office today for a preoperative consultation at the request of surgeon. The patient understands this is an elective procedure and not emergent. They are electing to undergo planned procedure with an understanding that all surgery has inherent risk. They have worked with their surgeon and failed conservative treatment measures. Today they present for preoperative risk assessment and recommendations for optimization in preparation for surgery.    Pt seen in surgical optimization center for upcoming proposed surgery. They have failed previous conservative measures and have elected surgical intervention.     Pt meets presurgical lab and BMI optimization goals.    Upon interview questioning patient is able to perform greater than 4 METs workload in daily life without any reported cardio-pulmonary symptoms.          ROS: No TIA's or unusual headaches, no dysphagia.  No prolonged cough. No dyspnea or chest pain on exertion.  No abdominal pain, change in bowel habits, black or bloody stools.  No urinary tract or BPH symptoms.  Positive reported pain in arthritic joint. Positive difficulty with gait. No skin rashes or issues.      Objective:    /78   Pulse 69   Ht 5' 8\" (1.727 m)   Wt 94.5 kg (208 lb 6.4 oz)   BMI 31.69 kg/m²       General Appearance: no distress, conversive  HEENT: PERRLA, conjuctiva normal; oropharynx clear; mucous membranes moist;   Neck:  Supple, no lymphadenopathy or thyromegaly  Lungs: breath sounds normal, normal respiratory effort, no retractions, expiratory effort normal  CV: normal heart sounds S1/S2, PMI normal   ABD: soft non tender, +BSx4; obese  EXT: DP pulses intact, no lymphadenopathy, no edema; ambulates with " "crutches  Skin: normal turgor, normal texture, no rash  Psych: affect normal, mood normal  Neuro: AAOx3        The following portions of the patient's history were reviewed and updated as appropriate: allergies, current medications, past family history, past medical history, past social history, past surgical history and problem list.     Past History:       History reviewed. No pertinent past medical history. Past Surgical History:   Procedure Laterality Date    APPENDECTOMY      5 yrs old    KNEE SURGERY      TONSILLECTOMY      13 yrs old          Social History     Tobacco Use    Smoking status: Every Day     Current packs/day: 0.50     Types: Cigarettes    Smokeless tobacco: Never   Vaping Use    Vaping status: Never Used   Substance Use Topics    Alcohol use: Not Currently    Drug use: Never     Family History   Problem Relation Age of Onset    Dementia Mother         passed away          Allergies:     Allergies   Allergen Reactions    Penicillins Anaphylaxis        Current Medications:     Current Outpatient Medications   Medication Instructions    ascorbic acid (VITAMIN C) 500 mg, Oral, 2 times daily    Cholecalciferol (VITAMIN D3) 2,000 Units, Oral, Daily    folic acid (FOLVITE) 1 mg, Oral, Daily    ibuprofen (MOTRIN) 200 mg, Oral, Every 6 hours PRN    lisinopril (ZESTRIL) 10 mg, Oral, Daily    meloxicam (MOBIC) 15 mg, Oral, Daily    Multiple Vitamins-Minerals (multivitamin with minerals) tablet 1 tablet, Oral, Daily           PRE-OP WORKSHEET DATA    Assessment of Pre-Operative Risks     MLJ Quality Hard Stops:    BMI (<40) : Estimated body mass index is 31.69 kg/m² as calculated from the following:    Height as of this encounter: 5' 8\" (1.727 m).    Weight as of this encounter: 94.5 kg (208 lb 6.4 oz).    Hgb ( >11):   Lab Results   Component Value Date    HGB 14.7 08/29/2024    HGB 14.4 01/11/2021    HGB 16.7 02/26/2015       HbA1c (<7.5) :   Lab Results   Component Value Date    HGBA1C 5.8 (H) " 08/29/2024       GFR (>60) (Less then 45 = Nephrology consult):    Lab Results   Component Value Date    EGFR 105 08/29/2024    EGFR 85 01/11/2021         Active Decompensated Chronic Conditions which would delay surgery  No acutely decompensated medical issues such as recent CVA, MI, new onset arrhythmia, severe aortic stenosis, CHF, uncontrolled COPD       Functional capacity: CLIMBING 2 FLIGHTS STAIRS, GARDENING                             4 METS  Pick the highest level patient can comfortably perform   (if less the 4 mets consider functional assessment via cardiac stress testing or consultation)    Assessment of intra and post operative respiratory, hemodynamic and thrombotic risks     Prior Anesthesia Reactions: No     Personal history of venous thromboembolic disease? No    History of steroid use > 5 mg for >2 weeks within last year? No      The patient's risk factors for cardiac complications include :  none    Efrain Sun Jr. has an IN HOSPITAL cardiac risk of RCI RISK CLASS I (0 risk factors, risk of major cardiac compl. appr. 0.5%) based on RCRI calculator    Cardiac Risk Estimation: per the Revised Cardiac Risk Index (Circ. 100:1043, 1999),        Pre-Op Data Reviewed:       Laboratory Results: I have personally reviewed the pertinent laboratory results/reports     EKG:I have personally reviewed pertinent reports.  . I personally reviewed and interpreted available tracings in the electronic medical record    Encounter Date: 08/29/24   EKG 12 lead   Result Value    Ventricular Rate 64    Atrial Rate 64    SC Interval 172    QRSD Interval 108    QT Interval 406    QTC Interval 418    P Axis 37    QRS Axis -21    T Wave Axis 42    Narrative    Normal sinus rhythm  Incomplete right bundle branch block  Borderline ECG  When compared with ECG of 11-JAN-2021 05:20,  No significant change was found  Confirmed by Frankie Jarquin ([id number]) on 8/29/2024 10:43:58 AM       OLD RECORDS: reviewed old records in the  chart review section if EHR on day of visit.    Previous cardiopulmonary studies within the past year:  Echocardiogram: no   Cardiac Catheterization: no  Stress Test: no      Time of visit including pre-visit chart review, visit and post-visit coordination of plan and care , review of pre-surgical lab work, preparation and time spent documenting note in electronic medical record, time spent face-to-face in physical examination answering patient questions by care team 35 minutes             [facility]

## 2024-08-27 NOTE — PROGRESS NOTES
Knee New Office Note    Assessment:     1. Primary osteoarthritis of right knee    2. Osteochondral defect of condyle of femur        Plan:     Problem List Items Addressed This Visit    None  Visit Diagnoses       Primary osteoarthritis of right knee    -  Primary    Osteochondral defect of condyle of femur               Findings today are consistent with right knee medial OCD and osteoarthritis of the medial compartment. Imaging and prognosis was reviewed with the patient today. Discussed treatment options including continued observation, low impact exercises, bracing, anti-inflammatories, physical therapy, cortisone injection, visco injection, versus surgical intervention. Discussed medial UKA versus total knee arthroplasty. Smoking cessation was reviewed, he must be smoke free 2 weeks prior and 2 weeks following surgical intervention. Meloxicam provided to control pain. His pain is significant and he has failed multiple rounds of conservative management. His OCD will worsen over time and may become a loose boy.    The patient has elected to proceed with right medial UKA vs TKA. Risks and benefits of surgery to include but not limited to bleeding, infection, damage to surrounding structures, hardware failure, instability, fracture, dislocation, need for further surgery, continued pain, stiffness, blood clots, stroke, and heart attack was discussed with the patient. Informed consent was signed today in the office. The patient has met with our surgical schedulers and our preoperative joint replacement pathway has been initiated. All questions were answered. Patient will follow-up 2 weeks post operatively. BMI is appropriate at 30.87 and is a nonsmoker. Follow up after surgery.    Subjective:     Patient ID: Efrain Sun Jr. is a 53 y.o. male.  Chief Complaint:  HPI:  53 y.o. male presents to the office for evaluation of right knee pain ongoing since a twisting injury at work in April 2024. He was referred to  us by Dr. Ludin Walter. He is experiencing medial and anterior right knee pain. He also notes locking episodes. He has been unable to fully weight-bear due to pain and has been waking up throughout the night. He has tried physical therapy and cortisone injection without relief. He is interested in discussing further treatment options. He works in maintenance. He is a current 1/2 pack per day smoker.    Allergy:  Allergies   Allergen Reactions    Penicillins Anaphylaxis     Medications:  all current active meds have been reviewed  Past Medical History:  No past medical history on file.  Past Surgical History:  Past Surgical History:   Procedure Laterality Date    APPENDECTOMY      5 yrs old    KNEE SURGERY      TONSILLECTOMY      13 yrs old     Family History:  Family History   Problem Relation Age of Onset    Dementia Mother         passed away     Social History:  Social History     Substance and Sexual Activity   Alcohol Use Not Currently     Social History     Substance and Sexual Activity   Drug Use Never     Social History     Tobacco Use   Smoking Status Every Day    Current packs/day: 0.50    Types: Cigarettes   Smokeless Tobacco Never         ROS:  General: Per HPI  Skin: Negative, except if noted below  HEENT: Negative  Respiratory: Negative  Cardiovascular: Negative  Gastrointestinal: Negative  Urinary: Negative  Vascular: Negative  Musculoskeletal: Positive per HPI   Neurologic: Positive per HPI  Endocrine: Negative    Objective:  BP Readings from Last 1 Encounters:   08/27/24 142/92      Wt Readings from Last 1 Encounters:   08/27/24 92.1 kg (203 lb)        Respiratory:   non-labored respirations    Lymphatics:  no palpable lymph nodes    Gait:   Ambulates with the assistance of 2 crutches    Neurologic:   Alert and oriented times 3  Patient with normal sensation except as noted below  Deep tendon reflexes 2+ except as noted in MSK exam    Bilateral Lower Extremity:    Right knee:     Inspection:  skin  "intact    Overall limb alignment neutral    Effusion: moderate    ROM 3-90 with pain    Extensor Lag: none    Palpation: medial Joint line tenderness to palpation    AP Stability at 90 deg stable    M/L stability in full extension stable    M/L stability in midflexion stable    Motor: 5/5 Q/HS/TA/GS/P    Pulses: 2+ DP / 2+ PT    SILT DP/SP/S/S/TN    Imaging:  My interpretation XR AP scanogram/AP bilateral knee/lateral/warren/sunrise right knee: severe medial joint space narrowing, subchondral sclerosis, subchondral cysts, osteophyte formation. No fracture or dislocation.     MRI right knee: full thickness cartilage loss of the medial joint compartment with a small degenerative meniscus tear posteriorly. Large medial femoral OCD lesion present. No cartilage wear of the lateral or lateral PF compartments and intact ACL/PCL.    BMI:   Estimated body mass index is 30.87 kg/m² as calculated from the following:    Height as of this encounter: 5' 8\" (1.727 m).    Weight as of this encounter: 92.1 kg (203 lb).  BSA:   Estimated body surface area is 2.06 meters squared as calculated from the following:    Height as of this encounter: 5' 8\" (1.727 m).    Weight as of this encounter: 92.1 kg (203 lb).           Scribe Attestation      I,:  Ericka Russell am acting as a scribe while in the presence of the attending physician.:       I,:  Torsten Ivan,  personally performed the services described in this documentation    as scribed in my presence.:              "

## 2024-08-27 NOTE — PATIENT INSTRUCTIONS
BEFORE SURGERY    Contact surgical nurse  navigator with any questions regarding preoperative plan or schedule.  Stop all over the counter supplements, herbal, naturopathic  medications for 1 week prior to surgery UNLESS prescribed by your surgeon  Hold NSAIDS (i.e. advil, alleve, motrin, ibuprofen, celebrex) minimum 5 days prior to surgery  Follow presurgical medication instructions provided by preadmission nursing team reviewed during your presurgery phone call  Strategies for optimizing your surgery through breathing exercises, nutrition and physical activity can be found at [email or web address]/best  Call [phone] with any presurgical concerns or medications questions    AFTER SURGERY    Recommend using Tylenol ( acetaminophen ) 1000 mg every eight hours during the first week post discharge along with icing the area for 20 mins every 3-4 hours while awake can be helpful in reducing your need for post operative opioid use. This opioid sparing plan can be used along side your surgeons pain plan.  Use stool softener over the counter (colace) daily after surgery during the first 1-2 weeks to avoid post operative constipation issues  If no bowel movement within 3 days after surgery then use over the counter Miralax in addition to your stool softener   If cleared by your surgical team for activity then early and often walking is encouraged and can be important in prevention of post surgical blood clots. Additionally spend as much time out of bed as possible and allowed by your surgical team  Use your incentive spirometer twice per hour in the first seven days after surgery to help prevent post surgery lung complications and infections  Call [phone] with any post discharge concerns or medical issues

## 2024-08-28 NOTE — TELEPHONE ENCOUNTER
Caller: Marcy at [facility]     Doctor: Moncho      Reason for call:      Requesting office notes, faxed to [phone].     Call back#: n/a

## 2024-08-29 ENCOUNTER — APPOINTMENT (OUTPATIENT)
Dept: LAB | Facility: CLINIC | Age: 54
End: 2024-08-29
Payer: COMMERCIAL

## 2024-08-29 ENCOUNTER — LAB REQUISITION (OUTPATIENT)
Dept: LAB | Facility: HOSPITAL | Age: 54
End: 2024-08-29
Payer: COMMERCIAL

## 2024-08-29 DIAGNOSIS — M95.8 OSTEOCHONDRAL DEFECT OF CONDYLE OF FEMUR: ICD-10-CM

## 2024-08-29 DIAGNOSIS — M17.11 PRIMARY OSTEOARTHRITIS OF RIGHT KNEE: ICD-10-CM

## 2024-08-29 DIAGNOSIS — M95.8 OTHER SPECIFIED ACQUIRED DEFORMITIES OF MUSCULOSKELETAL SYSTEM: ICD-10-CM

## 2024-08-29 DIAGNOSIS — M17.11 UNILATERAL PRIMARY OSTEOARTHRITIS, RIGHT KNEE: ICD-10-CM

## 2024-08-29 DIAGNOSIS — M95.8 OSTEOCHONDRAL DEFECT OF FEMORAL CONDYLE: ICD-10-CM

## 2024-08-29 LAB
ABO GROUP BLD: NORMAL
ALBUMIN SERPL BCG-MCNC: 4.2 G/DL (ref 3.5–5)
ALP SERPL-CCNC: 84 U/L (ref 34–104)
ALT SERPL W P-5'-P-CCNC: 18 U/L (ref 7–52)
ANION GAP SERPL CALCULATED.3IONS-SCNC: 11 MMOL/L (ref 4–13)
APTT PPP: 28 SECONDS (ref 23–34)
AST SERPL W P-5'-P-CCNC: 14 U/L (ref 13–39)
ATRIAL RATE: 64 BPM
BASOPHILS # BLD AUTO: 0.08 THOUSANDS/ÂΜL (ref 0–0.1)
BASOPHILS NFR BLD AUTO: 1 % (ref 0–1)
BILIRUB SERPL-MCNC: 0.51 MG/DL (ref 0.2–1)
BLD GP AB SCN SERPL QL: NEGATIVE
BUN SERPL-MCNC: 15 MG/DL (ref 5–25)
CALCIUM SERPL-MCNC: 9.5 MG/DL (ref 8.4–10.2)
CHLORIDE SERPL-SCNC: 103 MMOL/L (ref 96–108)
CO2 SERPL-SCNC: 24 MMOL/L (ref 21–32)
CREAT SERPL-MCNC: 0.77 MG/DL (ref 0.6–1.3)
EOSINOPHIL # BLD AUTO: 0.86 THOUSAND/ÂΜL (ref 0–0.61)
EOSINOPHIL NFR BLD AUTO: 12 % (ref 0–6)
ERYTHROCYTE [DISTWIDTH] IN BLOOD BY AUTOMATED COUNT: 11.9 % (ref 11.6–15.1)
EST. AVERAGE GLUCOSE BLD GHB EST-MCNC: 120 MG/DL
GFR SERPL CREATININE-BSD FRML MDRD: 105 ML/MIN/1.73SQ M
GLUCOSE SERPL-MCNC: 97 MG/DL (ref 65–140)
HBA1C MFR BLD: 5.8 %
HCT VFR BLD AUTO: 43.8 % (ref 36.5–49.3)
HGB BLD-MCNC: 14.7 G/DL (ref 12–17)
IMM GRANULOCYTES # BLD AUTO: 0.05 THOUSAND/UL (ref 0–0.2)
IMM GRANULOCYTES NFR BLD AUTO: 1 % (ref 0–2)
INR PPP: 0.95 (ref 0.85–1.19)
LYMPHOCYTES # BLD AUTO: 2.17 THOUSANDS/ÂΜL (ref 0.6–4.47)
LYMPHOCYTES NFR BLD AUTO: 29 % (ref 14–44)
MCH RBC QN AUTO: 32.7 PG (ref 26.8–34.3)
MCHC RBC AUTO-ENTMCNC: 33.6 G/DL (ref 31.4–37.4)
MCV RBC AUTO: 98 FL (ref 82–98)
MONOCYTES # BLD AUTO: 0.63 THOUSAND/ÂΜL (ref 0.17–1.22)
MONOCYTES NFR BLD AUTO: 9 % (ref 4–12)
NEUTROPHILS # BLD AUTO: 3.62 THOUSANDS/ÂΜL (ref 1.85–7.62)
NEUTS SEG NFR BLD AUTO: 48 % (ref 43–75)
NRBC BLD AUTO-RTO: 0 /100 WBCS
P AXIS: 37 DEGREES
PLATELET # BLD AUTO: 239 THOUSANDS/UL (ref 149–390)
PMV BLD AUTO: 12.1 FL (ref 8.9–12.7)
POTASSIUM SERPL-SCNC: 3.9 MMOL/L (ref 3.5–5.3)
PR INTERVAL: 172 MS
PROT SERPL-MCNC: 6.6 G/DL (ref 6.4–8.4)
PROTHROMBIN TIME: 12.9 SECONDS (ref 12.3–15)
QRS AXIS: -21 DEGREES
QRSD INTERVAL: 108 MS
QT INTERVAL: 406 MS
QTC INTERVAL: 418 MS
RBC # BLD AUTO: 4.49 MILLION/UL (ref 3.88–5.62)
RH BLD: POSITIVE
SODIUM SERPL-SCNC: 138 MMOL/L (ref 135–147)
SPECIMEN EXPIRATION DATE: NORMAL
T WAVE AXIS: 42 DEGREES
VENTRICULAR RATE: 64 BPM
WBC # BLD AUTO: 7.41 THOUSAND/UL (ref 4.31–10.16)

## 2024-08-29 PROCEDURE — 93005 ELECTROCARDIOGRAM TRACING: CPT

## 2024-08-29 PROCEDURE — 85730 THROMBOPLASTIN TIME PARTIAL: CPT

## 2024-08-29 PROCEDURE — 86850 RBC ANTIBODY SCREEN: CPT | Performed by: PHYSICIAN ASSISTANT

## 2024-08-29 PROCEDURE — 80053 COMPREHEN METABOLIC PANEL: CPT

## 2024-08-29 PROCEDURE — 86900 BLOOD TYPING SEROLOGIC ABO: CPT | Performed by: PHYSICIAN ASSISTANT

## 2024-08-29 PROCEDURE — 85025 COMPLETE CBC W/AUTO DIFF WBC: CPT

## 2024-08-29 PROCEDURE — 83036 HEMOGLOBIN GLYCOSYLATED A1C: CPT

## 2024-08-29 PROCEDURE — 86901 BLOOD TYPING SEROLOGIC RH(D): CPT | Performed by: PHYSICIAN ASSISTANT

## 2024-08-29 PROCEDURE — 36415 COLL VENOUS BLD VENIPUNCTURE: CPT

## 2024-08-29 PROCEDURE — 93010 ELECTROCARDIOGRAM REPORT: CPT | Performed by: INTERNAL MEDICINE

## 2024-08-29 PROCEDURE — 85610 PROTHROMBIN TIME: CPT

## 2024-08-29 NOTE — TELEPHONE ENCOUNTER
Preoperative Elective Admission Assessment- spoke to patient.       Living Situation:    Who does pt live with: spouse  What kind of home: apartment  How do they enter the home: front  How many levels in home: 1 level apartment    # of steps to enter home: 6 to enter apartment, inside the apartment complex.   # of steps to second floor: N/A   Are there handrails: Yes  Are there landings: No  Sleeping arrangement: second floor  Where is Bathroom: In apartment, step in tub with grab bars      First Floor Setup:  N/A- second floor apt      DME:  Crutches and RW (instructed to bring RW DOS).   We discussed clearing pathways in the home and making sure there is accessibly to use the walker, for example, removing throw rugs.      Patient's Current Level of Function: Ambulates: Independently and ADLs: Independent    Post-op Caregiver: spouse  Currently receive any HHC/aides/community supports: No     Post-op Transport: spouse  To/from hospital: spouse  To/from PT 2-3x/week: spouse  Uses community transport now: No     Outpatient Physical Therapy Site:  Site:  [facility]    pre and post-op appts scheduled? No     Medication Management: self  Preferred Pharmacy for Post-op Medications: [id number] - [place], [place] - [address] [[id number]]   Blood Management Vitamin Regimen:  Getting today to start.   Post-op anticoagulant: to be determined by surgical team postoperatively     DC Plan: Pt plans to be discharged home    Barriers to DC identified preoperatively: none identified    BMI: 30.87    Patient Education:  Pt educated on post-op pain, early mobilization (POD0), LOS goals, OP PT goals, and preoperative bathing. Patient educated that our goal is to appropriately discharge patient based off their post-op function while striving to maintain maximal independence. The goal is to discharge patient to home and for them to attend outpatient physical therapy.    Assigned to care team? Yes

## 2024-09-04 ENCOUNTER — TELEPHONE (OUTPATIENT)
Age: 54
End: 2024-09-04

## 2024-09-04 NOTE — TELEPHONE ENCOUNTER
Multiple attempts to get a hold of patient have been made to schedule for an appointment. There are multiple messages left and letter sent also     Pt was last seen in our office on 2/15/2021    [facility]       Please remove PCP from his chart     Thank you

## 2024-09-05 ENCOUNTER — OFFICE VISIT (OUTPATIENT)
Age: 54
End: 2024-09-05
Payer: OTHER MISCELLANEOUS

## 2024-09-05 VITALS
SYSTOLIC BLOOD PRESSURE: 146 MMHG | BODY MASS INDEX: 31.58 KG/M2 | HEIGHT: 68 IN | WEIGHT: 208.4 LBS | DIASTOLIC BLOOD PRESSURE: 78 MMHG | HEART RATE: 69 BPM

## 2024-09-05 DIAGNOSIS — I10 ESSENTIAL HYPERTENSION: ICD-10-CM

## 2024-09-05 DIAGNOSIS — M17.11 PRIMARY OSTEOARTHRITIS OF RIGHT KNEE: ICD-10-CM

## 2024-09-05 DIAGNOSIS — E66.09 CLASS 1 OBESITY DUE TO EXCESS CALORIES WITHOUT SERIOUS COMORBIDITY WITH BODY MASS INDEX (BMI) OF 30.0 TO 30.9 IN ADULT: ICD-10-CM

## 2024-09-05 DIAGNOSIS — Z01.818 PREOPERATIVE CLEARANCE: Primary | ICD-10-CM

## 2024-09-05 PROCEDURE — 99214 OFFICE O/P EST MOD 30 MIN: CPT | Performed by: NURSE PRACTITIONER

## 2024-09-06 NOTE — TELEPHONE ENCOUNTER
09/06/24 11:17 AM        The office's request has been received, reviewed, and the patient chart updated. The PCP has successfully been removed with a patient attribution note. This message will now be completed.        Thank you  Ryan Kuhn

## 2024-09-10 ENCOUNTER — TELEPHONE (OUTPATIENT)
Age: 54
End: 2024-09-10

## 2024-09-10 NOTE — TELEPHONE ENCOUNTER
Caller: patient spouse    Doctor/Office:     Call regarding :  Calling for ortho     Call was transferred to: ortho

## 2024-09-10 NOTE — TELEPHONE ENCOUNTER
Patient's spouse transferred from [facility] returning a call no vm was left so unsure who was calling or regarding

## 2024-10-16 ENCOUNTER — TELEPHONE (OUTPATIENT)
Age: 54
End: 2024-10-16

## 2024-10-16 NOTE — TELEPHONE ENCOUNTER
Caller: Marcy FITZGERALD     Doctor: Moncho     Reason for call: Checking status of SX. Relayed the SX was CX.    Call back#: [phone]

## 2025-02-06 ENCOUNTER — TELEPHONE (OUTPATIENT)
Age: 55
End: 2025-02-06